# Patient Record
Sex: MALE | Race: WHITE | NOT HISPANIC OR LATINO | Employment: FULL TIME | URBAN - METROPOLITAN AREA
[De-identification: names, ages, dates, MRNs, and addresses within clinical notes are randomized per-mention and may not be internally consistent; named-entity substitution may affect disease eponyms.]

---

## 2021-01-29 ENCOUNTER — HOSPITAL ENCOUNTER (EMERGENCY)
Facility: CLINIC | Age: 36
Discharge: HOME | End: 2021-01-29
Attending: PHYSICIAN ASSISTANT | Admitting: PHYSICIAN ASSISTANT
Payer: COMMERCIAL

## 2021-01-29 ENCOUNTER — OFFICE VISIT (OUTPATIENT)
Dept: URGENT CARE | Facility: URGENT CARE | Age: 36
End: 2021-01-29
Payer: COMMERCIAL

## 2021-01-29 ENCOUNTER — APPOINTMENT (OUTPATIENT)
Dept: CT IMAGING | Facility: CLINIC | Age: 36
End: 2021-01-29
Attending: PHYSICIAN ASSISTANT
Payer: COMMERCIAL

## 2021-01-29 ENCOUNTER — NURSE TRIAGE (OUTPATIENT)
Dept: NURSING | Facility: CLINIC | Age: 36
End: 2021-01-29

## 2021-01-29 VITALS
OXYGEN SATURATION: 99 % | DIASTOLIC BLOOD PRESSURE: 86 MMHG | TEMPERATURE: 98.4 F | HEART RATE: 77 BPM | SYSTOLIC BLOOD PRESSURE: 134 MMHG

## 2021-01-29 VITALS
WEIGHT: 188 LBS | DIASTOLIC BLOOD PRESSURE: 86 MMHG | TEMPERATURE: 98.7 F | SYSTOLIC BLOOD PRESSURE: 141 MMHG | RESPIRATION RATE: 12 BRPM | BODY MASS INDEX: 29.51 KG/M2 | HEART RATE: 73 BPM | OXYGEN SATURATION: 98 % | HEIGHT: 67 IN

## 2021-01-29 DIAGNOSIS — K63.89 EPIPLOIC APPENDAGITIS: ICD-10-CM

## 2021-01-29 DIAGNOSIS — R10.32 LLQ ABDOMINAL PAIN: Primary | ICD-10-CM

## 2021-01-29 LAB
ALBUMIN SERPL-MCNC: 3.8 G/DL (ref 3.4–5)
ALBUMIN UR-MCNC: NEGATIVE MG/DL
ALP SERPL-CCNC: 60 U/L (ref 40–150)
ALT SERPL W P-5'-P-CCNC: 34 U/L (ref 0–70)
ANION GAP SERPL CALCULATED.3IONS-SCNC: 4 MMOL/L (ref 3–14)
APPEARANCE UR: CLEAR
AST SERPL W P-5'-P-CCNC: 24 U/L (ref 0–45)
BASOPHILS # BLD AUTO: 0 10E9/L (ref 0–0.2)
BASOPHILS NFR BLD AUTO: 0.5 %
BILIRUB SERPL-MCNC: 0.6 MG/DL (ref 0.2–1.3)
BILIRUB UR QL STRIP: NEGATIVE
BUN SERPL-MCNC: 13 MG/DL (ref 7–30)
CALCIUM SERPL-MCNC: 9.3 MG/DL (ref 8.5–10.1)
CHLORIDE SERPL-SCNC: 107 MMOL/L (ref 94–109)
CO2 SERPL-SCNC: 28 MMOL/L (ref 20–32)
COLOR UR AUTO: ABNORMAL
CREAT SERPL-MCNC: 0.95 MG/DL (ref 0.66–1.25)
DIFFERENTIAL METHOD BLD: NORMAL
EOSINOPHIL # BLD AUTO: 0.1 10E9/L (ref 0–0.7)
EOSINOPHIL NFR BLD AUTO: 2.3 %
ERYTHROCYTE [DISTWIDTH] IN BLOOD BY AUTOMATED COUNT: 11.5 % (ref 10–15)
GFR SERPL CREATININE-BSD FRML MDRD: >90 ML/MIN/{1.73_M2}
GLUCOSE SERPL-MCNC: 83 MG/DL (ref 70–99)
GLUCOSE UR STRIP-MCNC: NEGATIVE MG/DL
HCT VFR BLD AUTO: 45.2 % (ref 40–53)
HGB BLD-MCNC: 15.4 G/DL (ref 13.3–17.7)
HGB UR QL STRIP: NEGATIVE
IMM GRANULOCYTES # BLD: 0 10E9/L (ref 0–0.4)
IMM GRANULOCYTES NFR BLD: 0.4 %
KETONES UR STRIP-MCNC: 10 MG/DL
LACTATE BLD-SCNC: 0.7 MMOL/L (ref 0.7–2)
LEUKOCYTE ESTERASE UR QL STRIP: NEGATIVE
LIPASE SERPL-CCNC: 78 U/L (ref 73–393)
LYMPHOCYTES # BLD AUTO: 1.4 10E9/L (ref 0.8–5.3)
LYMPHOCYTES NFR BLD AUTO: 24.8 %
MCH RBC QN AUTO: 30.1 PG (ref 26.5–33)
MCHC RBC AUTO-ENTMCNC: 34.1 G/DL (ref 31.5–36.5)
MCV RBC AUTO: 88 FL (ref 78–100)
MONOCYTES # BLD AUTO: 0.7 10E9/L (ref 0–1.3)
MONOCYTES NFR BLD AUTO: 11.8 %
NEUTROPHILS # BLD AUTO: 3.4 10E9/L (ref 1.6–8.3)
NEUTROPHILS NFR BLD AUTO: 60.2 %
NITRATE UR QL: NEGATIVE
NRBC # BLD AUTO: 0 10*3/UL
NRBC BLD AUTO-RTO: 0 /100
PH UR STRIP: 6 PH (ref 5–7)
PLATELET # BLD AUTO: 226 10E9/L (ref 150–450)
POTASSIUM SERPL-SCNC: 3.8 MMOL/L (ref 3.4–5.3)
PROT SERPL-MCNC: 7.1 G/DL (ref 6.8–8.8)
RBC # BLD AUTO: 5.12 10E12/L (ref 4.4–5.9)
RBC #/AREA URNS AUTO: <1 /HPF (ref 0–2)
SODIUM SERPL-SCNC: 139 MMOL/L (ref 133–144)
SOURCE: ABNORMAL
SP GR UR STRIP: 1.01 (ref 1–1.03)
UROBILINOGEN UR STRIP-MCNC: NORMAL MG/DL (ref 0–2)
WBC # BLD AUTO: 5.6 10E9/L (ref 4–11)
WBC #/AREA URNS AUTO: <1 /HPF (ref 0–5)

## 2021-01-29 PROCEDURE — 81001 URINALYSIS AUTO W/SCOPE: CPT | Performed by: PHYSICIAN ASSISTANT

## 2021-01-29 PROCEDURE — 85025 COMPLETE CBC W/AUTO DIFF WBC: CPT | Performed by: PHYSICIAN ASSISTANT

## 2021-01-29 PROCEDURE — 250N000011 HC RX IP 250 OP 636: Performed by: PHYSICIAN ASSISTANT

## 2021-01-29 PROCEDURE — 99285 EMERGENCY DEPT VISIT HI MDM: CPT | Mod: 25

## 2021-01-29 PROCEDURE — 83605 ASSAY OF LACTIC ACID: CPT | Performed by: PHYSICIAN ASSISTANT

## 2021-01-29 PROCEDURE — 258N000003 HC RX IP 258 OP 636: Performed by: PHYSICIAN ASSISTANT

## 2021-01-29 PROCEDURE — 250N000009 HC RX 250: Performed by: PHYSICIAN ASSISTANT

## 2021-01-29 PROCEDURE — 74177 CT ABD & PELVIS W/CONTRAST: CPT

## 2021-01-29 PROCEDURE — 83690 ASSAY OF LIPASE: CPT | Performed by: PHYSICIAN ASSISTANT

## 2021-01-29 PROCEDURE — 80053 COMPREHEN METABOLIC PANEL: CPT | Performed by: PHYSICIAN ASSISTANT

## 2021-01-29 PROCEDURE — 99207 PR NO CHARGE LOS: CPT | Performed by: FAMILY MEDICINE

## 2021-01-29 PROCEDURE — 96360 HYDRATION IV INFUSION INIT: CPT | Mod: 59

## 2021-01-29 RX ORDER — IOPAMIDOL 755 MG/ML
500 INJECTION, SOLUTION INTRAVASCULAR ONCE
Status: COMPLETED | OUTPATIENT
Start: 2021-01-29 | End: 2021-01-29

## 2021-01-29 RX ORDER — SODIUM CHLORIDE 9 MG/ML
INJECTION, SOLUTION INTRAVENOUS CONTINUOUS
Status: DISCONTINUED | OUTPATIENT
Start: 2021-01-29 | End: 2021-01-29 | Stop reason: HOSPADM

## 2021-01-29 RX ORDER — OXYCODONE HYDROCHLORIDE 5 MG/1
5 TABLET ORAL EVERY 6 HOURS PRN
Qty: 12 TABLET | Refills: 0 | Status: SHIPPED | OUTPATIENT
Start: 2021-01-29 | End: 2022-12-15

## 2021-01-29 RX ADMIN — IOPAMIDOL 94 ML: 755 INJECTION, SOLUTION INTRAVENOUS at 15:41

## 2021-01-29 RX ADMIN — SODIUM CHLORIDE 1000 ML: 9 INJECTION, SOLUTION INTRAVENOUS at 15:21

## 2021-01-29 RX ADMIN — SODIUM CHLORIDE 64 ML: 9 INJECTION, SOLUTION INTRAVENOUS at 15:41

## 2021-01-29 ASSESSMENT — ENCOUNTER SYMPTOMS
ABDOMINAL PAIN: 1
FEVER: 0
COUGH: 0
CHILLS: 0
SHORTNESS OF BREATH: 0
VOMITING: 0
NAUSEA: 0
SORE THROAT: 0
BLOOD IN STOOL: 0
RHINORRHEA: 0

## 2021-01-29 ASSESSMENT — MIFFLIN-ST. JEOR: SCORE: 1736.39

## 2021-01-29 NOTE — ED TRIAGE NOTES
Pt here with c/o LLQ pain since Tuesday. Pain worse with movement. Non-radiating pain. No n/v/d or diaphoresis. ABC intact. A&O x4.

## 2021-01-29 NOTE — TELEPHONE ENCOUNTER
Patient is looking to get back in shape. Runs 1-2 marathns a year. He was walking on Monday 10 mile and Tuesday he walked 14 miles.  Pain on Tues he got an abdomen pain in lower left abdomen that came after walking 3/10 and constant. NO bulge in skin anywhere. NO vomiting. NO bowel or bladder changes. NO known fever. Bending or twisting makes the pain worse.   Normally when he runs the pain will come and go away within 30 minutes.     Advised [facility] for evaluation, asked if pain becomes severe to go to the ED> Hours and location of the [facility] given. Declined information on the [facility] location.     Uma Nuñez RN on 1/29/2021 at 8:20 AM        Additional Information    Negative: Passed out (i.e., fainted, collapsed and was not responding)    Negative: Shock suspected (e.g., cold/pale/clammy skin, too weak to stand, low BP, rapid pulse)    Negative: Sounds like a life-threatening emergency to the triager    Negative: SEVERE abdominal pain (e.g., excruciating)    Negative: Vomiting red blood or black (coffee ground) material    Negative: Bloody, black, or tarry bowel movements    Negative: Unable to urinate (or only a few drops) and bladder feels very full    Negative: Pain in scrotum persists > 1 hour    Negative: Chest pain    Negative: Pain is mainly in upper abdomen (if needed ask: 'is it mainly above the belly button?')    Constant abdominal pain lasting > 2 hours    Protocols used: ABDOMINAL PAIN - MALE-A-OH

## 2021-01-29 NOTE — PROGRESS NOTES
THIS IS A TRIAGE ENCOUNTER.     Vladimir Gallego is a 35 year old male who presents with a four-day  history of worsening severe LLQ abdominal pain triggered by palpation, by walking, by any bending at the waist, twisting of the torso in both directions, and prolonged running.  The pain is sharp and lasts about 30 minutes at a time.      Given the possible need for more sophisticated imaging than our X-rays, patient will go to the emergency room today for further evaluation.  I told the patient that he would not be charged for this brief triage evaluation.  .      Romeo Albert MD

## 2021-01-29 NOTE — ED PROVIDER NOTES
History   Chief Complaint:  Abdominal Pain     MARÍA Gallego is a 35 year old male, otherwise healthy who presents with left-sided abdominal pain for the past 3 days. The patient reports that he has had lower left abdominal pain for the past 3 days. This pain is non-radiating and worsens when he moves. At rest his pain is a 3-4/10, but it increases significantly with movement or palpation. He was seen at urgent care today and was referred to the emergency department for more sophisticated imaging studies. The patient does mentions having some diarrhea, but states that this has been going for a while and is unchanged. He otherwise denies any fever, chills, chest pain, shortness of breath, nausea, vomiting, bloody stool, cough, rhinorrhea, sore throat, urinary symptoms, testicular pain or rash. Of note, the patient states he normally runs marathons, but since the pandemic began he has not been very active. He does note running 14 minutes a few days ago and had a stitch in his side that never really went away and is in the same area as his current pain.     Review of Systems   Constitutional: Negative for chills and fever.   HENT: Negative for rhinorrhea and sore throat.    Respiratory: Negative for cough and shortness of breath.    Gastrointestinal: Positive for abdominal pain. Negative for blood in stool, nausea and vomiting.   Genitourinary: Negative.    Skin: Negative for rash.   All other systems reviewed and are negative.      Allergies:  The patient has no known allergies.     Medications:  The patient is not currently taking any prescribed medications.    Past Medical History:    The patient denies past medical history.    Past Surgical History:    Appendectomy     Social History:  The patient presents with a female compantion to the emergency department.  The patient states that he runs marathons.     Physical Exam     Patient Vitals for the past 24 hrs:   BP Temp Temp src Pulse Resp SpO2 Height  "Weight   01/29/21 1330 (!) 155/112 98.7  F (37.1  C) Oral 87 16 98 % 1.702 m (5' 7\") 85.3 kg (188 lb)       Physical Exam  Constitutional: Pleasant. Cooperative.   Eyes: Pupils equally round and reactive  HENT: Head is normal in appearance. Oropharynx is normal with moist mucus membranes.  Cardiovascular: Regular rate and rhythm and without murmurs.  Respiratory: Normal respiratory effort, lungs are clear bilaterally.  GI: LLQ TTP, otherwise non-tender, soft, non-distended. No guarding, rebound, or rigidity.  Musculoskeletal: No asymmetry of the lower extremities, no tenderness to palpation. No CVA TTP.  Skin: Normal, without rash.  Neurologic: Cranial nerves grossly intact, normal cognition, no focal deficits. Alert and oriented x 3.   Psychiatric: Normal affect.  Nursing notes and vital signs reviewed.      Emergency Department Course     Imaging:  CT Abdomen Pelvis W Contrast  1.  Epiploic appendagitis distal descending colon. No evidence of  bowel obstruction, diverticulitis or appendicitis.  2.  Abdominal and pelvic organs are within normal limits.  Reading per radiology.    Laboratory:  CBC: WNL. (WBC 5.6, HGB 15.4, )   CMP: AWNL (Creatinine 0.95)    Lactic Acid (Collected at 1519): 0.7  Lipase: 78    UA with micro: Ketones 10, o/w negative    Emergency Department Course:    Reviewed:  I reviewed nursing notes, vitals and care everywhere    Assessments:  1510 I obtained history and examined the patient as noted above.   1632 I rechecked the patient and explained findings.     Interventions:  1521 NS, 1 L, IV bolus      Disposition:  The patient was discharged to home.       Impression & Plan     Medical Decision Making:  Vladimir Gallego is a 35 year old male who presents to the ED for evaluation of abdominal pain.  Is been present for the last 3 days in the LLQ.  See HPI as above for additional details.  Vitals and physical exam as above.  Differential was broad and included diverticulitis, kidney " stone,  pathology, hernia, UTI/pyelonephritis, constipation, among others.  Work-up as above is notable for evidence consistent with epiploic appendagitis.  Suspect this is etiology of patient's symptoms.  Discussed treatment is conservative management.  Will provide short course of oxycodone for home.  Discussed narcotic precautions. Felt patient was safe for discharge to home. Discussed reasons to return. All questions answered. Patient discharged to home in stable condition.    Diagnosis:    ICD-10-CM    1. Epiploic appendagitis  K63.89 UA with Microscopic reflex to Culture       Discharge Medications:  New Prescriptions    OXYCODONE (ROXICODONE) 5 MG TABLET    Take 1 tablet (5 mg) by mouth every 6 hours as needed for pain       Scribe Disclosure:  I, Marc Courtney, am serving as a scribe on 1/29/2021 at 3:14 PM to personally document services performed by Valentino Joaquin PA-C based on my observations and the provider's statements to me.     This record was created at least in part using electronic voice recognition software, so please excuse any typographical errors.           Valentino Joaquin PA-C  01/29/21 7680

## 2021-06-01 ENCOUNTER — RECORDS - HEALTHEAST (OUTPATIENT)
Dept: ADMINISTRATIVE | Facility: CLINIC | Age: 36
End: 2021-06-01

## 2022-12-01 ENCOUNTER — HOSPITAL ENCOUNTER (EMERGENCY)
Facility: CLINIC | Age: 37
Discharge: HOME | End: 2022-12-01
Attending: EMERGENCY MEDICINE | Admitting: EMERGENCY MEDICINE
Payer: COMMERCIAL

## 2022-12-01 VITALS
OXYGEN SATURATION: 98 % | TEMPERATURE: 98.4 F | RESPIRATION RATE: 10 BRPM | HEART RATE: 84 BPM | DIASTOLIC BLOOD PRESSURE: 106 MMHG | SYSTOLIC BLOOD PRESSURE: 140 MMHG

## 2022-12-01 DIAGNOSIS — I48.91 ATRIAL FIBRILLATION, UNSPECIFIED TYPE (H): ICD-10-CM

## 2022-12-01 LAB
ANION GAP SERPL CALCULATED.3IONS-SCNC: 12 MMOL/L (ref 7–15)
ATRIAL RATE - MUSE: 147 BPM
BASOPHILS # BLD AUTO: 0.1 10E3/UL (ref 0–0.2)
BASOPHILS NFR BLD AUTO: 1 %
BUN SERPL-MCNC: 17.6 MG/DL (ref 6–20)
CALCIUM SERPL-MCNC: 9.4 MG/DL (ref 8.6–10)
CHLORIDE SERPL-SCNC: 103 MMOL/L (ref 98–107)
CREAT SERPL-MCNC: 0.93 MG/DL (ref 0.67–1.17)
DEPRECATED HCO3 PLAS-SCNC: 22 MMOL/L (ref 22–29)
DIASTOLIC BLOOD PRESSURE - MUSE: NORMAL MMHG
EOSINOPHIL # BLD AUTO: 0.4 10E3/UL (ref 0–0.7)
EOSINOPHIL NFR BLD AUTO: 4 %
ERYTHROCYTE [DISTWIDTH] IN BLOOD BY AUTOMATED COUNT: 11.7 % (ref 10–15)
GFR SERPL CREATININE-BSD FRML MDRD: >90 ML/MIN/1.73M2
GLUCOSE SERPL-MCNC: 120 MG/DL (ref 70–99)
HCT VFR BLD AUTO: 46.4 % (ref 40–53)
HGB BLD-MCNC: 15.9 G/DL (ref 13.3–17.7)
HOLD SPECIMEN: NORMAL
IMM GRANULOCYTES # BLD: 0.1 10E3/UL
IMM GRANULOCYTES NFR BLD: 1 %
INTERPRETATION ECG - MUSE: NORMAL
LYMPHOCYTES # BLD AUTO: 1.9 10E3/UL (ref 0.8–5.3)
LYMPHOCYTES NFR BLD AUTO: 22 %
MCH RBC QN AUTO: 31.1 PG (ref 26.5–33)
MCHC RBC AUTO-ENTMCNC: 34.3 G/DL (ref 31.5–36.5)
MCV RBC AUTO: 91 FL (ref 78–100)
MONOCYTES # BLD AUTO: 0.7 10E3/UL (ref 0–1.3)
MONOCYTES NFR BLD AUTO: 8 %
NEUTROPHILS # BLD AUTO: 5.6 10E3/UL (ref 1.6–8.3)
NEUTROPHILS NFR BLD AUTO: 64 %
NRBC # BLD AUTO: 0 10E3/UL
NRBC BLD AUTO-RTO: 0 /100
P AXIS - MUSE: NORMAL DEGREES
PLATELET # BLD AUTO: 271 10E3/UL (ref 150–450)
POTASSIUM SERPL-SCNC: 4.2 MMOL/L (ref 3.4–5.3)
PR INTERVAL - MUSE: NORMAL MS
QRS DURATION - MUSE: 86 MS
QT - MUSE: 274 MS
QTC - MUSE: 415 MS
R AXIS - MUSE: 52 DEGREES
RBC # BLD AUTO: 5.11 10E6/UL (ref 4.4–5.9)
SODIUM SERPL-SCNC: 137 MMOL/L (ref 136–145)
SYSTOLIC BLOOD PRESSURE - MUSE: NORMAL MMHG
T AXIS - MUSE: 15 DEGREES
TROPONIN T SERPL HS-MCNC: 9 NG/L
VENTRICULAR RATE- MUSE: 138 BPM
WBC # BLD AUTO: 8.7 10E3/UL (ref 4–11)

## 2022-12-01 PROCEDURE — 99285 EMERGENCY DEPT VISIT HI MDM: CPT | Mod: 25

## 2022-12-01 PROCEDURE — 999N000157 HC STATISTIC RCP TIME EA 10 MIN

## 2022-12-01 PROCEDURE — 92960 CARDIOVERSION ELECTRIC EXT: CPT

## 2022-12-01 PROCEDURE — 36415 COLL VENOUS BLD VENIPUNCTURE: CPT | Performed by: EMERGENCY MEDICINE

## 2022-12-01 PROCEDURE — 84484 ASSAY OF TROPONIN QUANT: CPT | Performed by: EMERGENCY MEDICINE

## 2022-12-01 PROCEDURE — 85025 COMPLETE CBC W/AUTO DIFF WBC: CPT | Performed by: EMERGENCY MEDICINE

## 2022-12-01 PROCEDURE — 999N000009 HC STATISTIC AIRWAY CARE

## 2022-12-01 PROCEDURE — 93005 ELECTROCARDIOGRAM TRACING: CPT

## 2022-12-01 PROCEDURE — 250N000011 HC RX IP 250 OP 636: Performed by: EMERGENCY MEDICINE

## 2022-12-01 PROCEDURE — 999N000099 HC STATISTIC MODERATE SEDATION < 10 MIN

## 2022-12-01 PROCEDURE — 99152 MOD SED SAME PHYS/QHP 5/>YRS: CPT

## 2022-12-01 PROCEDURE — 82310 ASSAY OF CALCIUM: CPT | Performed by: EMERGENCY MEDICINE

## 2022-12-01 RX ORDER — PROPOFOL 10 MG/ML
200 INJECTION, EMULSION INTRAVENOUS ONCE
Status: COMPLETED | OUTPATIENT
Start: 2022-12-01 | End: 2022-12-01

## 2022-12-01 RX ADMIN — PROPOFOL 30 MG: 10 INJECTION, EMULSION INTRAVENOUS at 07:50

## 2022-12-01 RX ADMIN — PROPOFOL 60 MG: 10 INJECTION, EMULSION INTRAVENOUS at 07:47

## 2022-12-01 RX ADMIN — PROPOFOL 30 MG: 10 INJECTION, EMULSION INTRAVENOUS at 07:49

## 2022-12-01 ASSESSMENT — ENCOUNTER SYMPTOMS
LIGHT-HEADEDNESS: 1
NAUSEA: 0
ABDOMINAL PAIN: 0
PALPITATIONS: 1
VOMITING: 0
DIARRHEA: 0

## 2022-12-01 ASSESSMENT — ACTIVITIES OF DAILY LIVING (ADL)
ADLS_ACUITY_SCORE: 33
ADLS_ACUITY_SCORE: 35

## 2022-12-01 NOTE — ED PROVIDER NOTES
History   Chief Complaint:  Palpitations       The history is provided by the patient.      Vladimir Gallego is a 37 year old male who presents for evaluation of palpitations. He reports feeling fine before going to sleep last night, but then at 0400 today he woke up to let the dog out and felt palpitations described as fast and occasionally skipped beats. The patient was able to go back to sleep, but then woke up with continued palpitations. He notes feeling lightheaded this morning, but denies any other recent symptoms including nausea, vomiting, chest pain, abdominal pain, and diarrhea. The patient notes that he had greater than two alcoholic drinks last night. He denies history of palpations or atrial fibrillation.     Review of Systems   Cardiovascular: Positive for palpitations. Negative for chest pain.   Gastrointestinal: Negative for abdominal pain, diarrhea, nausea and vomiting.   Neurological: Positive for light-headedness.   All other systems reviewed and are negative.    Allergies:  The patient has no known allergies.     Medications:  The patient is currently on no regular medications.     Past Medical History:     The patient denies past medical history including atrial fibrillation.     Social History:  The patient presents to the ED with his wife  Arrived from home via private vehicle   He reports alcohol use, including last night    Physical Exam     Patient Vitals for the past 24 hrs:   BP Temp Temp src Pulse Resp SpO2   12/01/22 0853 -- -- -- 84 10 98 %   12/01/22 0843 (!) 140/106 -- -- 84 (!) 0 98 %   12/01/22 0833 (!) 134/97 -- -- 86 12 99 %   12/01/22 0823 -- -- -- 73 18 99 %   12/01/22 0813 (!) 127/91 -- -- 80 (!) 5 100 %   12/01/22 0808 134/88 -- -- 81 10 98 %   12/01/22 0803 115/80 -- -- 76 25 98 %   12/01/22 0800 120/80 -- -- -- -- --   12/01/22 0759 -- -- -- 80 28 99 %   12/01/22 0754 -- -- -- -- 24 --   12/01/22 0754 -- -- -- 87 13 99 %   12/01/22 0752 -- -- -- 88 16 99 %   12/01/22  0751 -- -- -- (!) 168 10 100 %   12/01/22 0750 106/75 -- -- (!) 163 (!) 9 98 %   12/01/22 0749 (!) 114/100 -- -- (!) 170 10 99 %   12/01/22 0748 101/63 -- -- (!) 179 -- --   12/01/22 0747 -- -- -- (!) 158 (!) 7 100 %   12/01/22 0746 (!) 128/111 -- -- (!) 147 12 99 %   12/01/22 0623 (!) 132/101 98.4  F (36.9  C) Temporal 115 20 100 %       Physical Exam  Vitals: reviewed by me  General: Pt seen on [facility], cooperative, and alert to conversation  Eyes: Tracking well, clear conjunctiva BL  ENT: MMM, midline trachea.   Lungs: No tachypnea, no accessory muscle use. No respiratory distress.   CV: Rate as above, normal S1-S2, no additional heart sounds heard, irregularly irregular rhythm  Abd: Soft, non tender, no guarding, no rebound. Non distended  MSK: no joint effusion.  No evidence of trauma  Skin: No rash  Neuro: Clear speech and no facial droop.  Moving all extremity spontaneously, following all commands.  Psych: Not RIS, no e/o AH/VH      Emergency Department Course   ECG #1  ECG taken at 0614   Atrial fibrillation with rapid ventricular response   Abnormal ECG   No previous ECGs available   Rate 138 bpm. OR interval * ms. QRS duration 86 ms. QT/QTc 274/415 ms. P-R-T axes * 52 15.     ECG #2 (post cardioversion)  ECG taken at 0753  Normal sinus rhythm  Normal ECG    Rate 93 bpm. OR interval 134 ms. QRS duration 94 ms. QT/QTc 358/445 ms. P-R-T axes 35 46 31.       Laboratory:  Labs Ordered and Resulted from Time of ED Arrival to Time of ED Departure   BASIC METABOLIC PANEL - Abnormal       Result Value    Sodium 137      Potassium 4.2      Chloride 103      Carbon Dioxide (CO2) 22      Anion Gap 12      Urea Nitrogen 17.6      Creatinine 0.93      Calcium 9.4      Glucose 120 (*)     GFR Estimate >90     TROPONIN T, HIGH SENSITIVITY - Normal    Troponin T, High Sensitivity 9     CBC WITH PLATELETS AND DIFFERENTIAL    WBC Count 8.7      RBC Count 5.11      Hemoglobin 15.9      Hematocrit 46.4       MCV 91      MCH 31.1      MCHC 34.3      RDW 11.7      Platelet Count 271      % Neutrophils 64      % Lymphocytes 22      % Monocytes 8      % Eosinophils 4      % Basophils 1      % Immature Granulocytes 1      NRBCs per 100 WBC 0      Absolute Neutrophils 5.6      Absolute Lymphocytes 1.9      Absolute Monocytes 0.7      Absolute Eosinophils 0.4      Absolute Basophils 0.1      Absolute Immature Granulocytes 0.1      Absolute NRBCs 0.0          Procedures  Electrical Cardioversion         Procedure: Electrical Cardioversion        Indication: Atrial Fibrillation     Consent: Written from Patient     Universal Protocol: Universal protocol was followed and time out conducted just prior to starting procedure, confirming patient identity, site/side, procedure, patient position, and availability of correct equipment and implants.      Anesthesia/Sedation: Sedation: The patient was sedated, see separate procedure note for details.      Procedure Detail:   Electrodes were placed: Anterior/posterior  Trial #1: Synchronized Cardioversion at 200 Joules   Cardioversion was successful      Patient Status:  The patient tolerated the procedure well: Yes. There were no complications.      Sedation     Procedure: Procedural Sedation    Sedation Level: Moderate    Indication: Cardioversion    Consent: Written from Patient     Universal protocol: Universal protocol was followed and time out conducted just prior to starting procedure, confirming patient identity, site/side, procedure, patient position, and availability of correct equipment and implants.     Last PO Intake: Emergent procedure    ASA Class: Class 1 - A normal healthy patient     Exam:  Mallampati:  Grade 1 - Soft palate, uvula, and pillars visible    Lungs: Clear   Heart: Regular rate and rhythm checked, irreg irreg      Medication: Propofol  Dose: 120 mg (60 mg, then 2x 30 mg)    Monitoring:  Monitoring consisted of heart rate, cardiac, continuous pulse oximetry,  frequent blood pressure checks.   There was constant attendance by RN until patient recovered and constant attendance by physician until patient stable.   Intubation and emergency airway equipment available.     Response: Vital signs stable, oxygen saturations greater than 92%     Patient Status: Post procedure patient was alert.     Total Physician Drug Administration / Monitoring Time: 20 minutes.     Patient was monitored during recovery and returned to pre-procedure baseline.       Emergency Department Course:     Reviewed:  I reviewed nursing notes, vitals and past medical history     Assessments:  0651 I obtained history and examined the patient as noted above. I explained findings.  0730 I updated the patient and obtained consent for sedation and cardioversion procedure.  0741 I performed the sedation and cardioversion procedure at this time, see procedure note above.  0901 I rechecked and updated the patient. At this point I feel that the patient is safe for discharge, and the patient agrees.    Interventions:  0747 Propofol 60 mg IV  0749 Propofol 30 mg IV  0750 Propofol 30 mg IV    Disposition:  The patient was discharged to home.     Impression & Plan     Medical Decision Making:  This is a very pleasant 37-year-old male who presents to the emergency room with appears to be A. fib.  He is quite clear about the onset, and is a very reliable historian.  Therefore we were able to cardiovert him, see note as above, with good results.  After sedation, he is eating and drinking and walking around now and feels fine.  He has no chest pain before or after the event, and only palpitations.  It may have been related to alcohol, we recommended decreasing his intake, and following up with his regular doctor the next 1 week to see if he merits qualification for referral to a cardiologist.  Red flags for when to come back to the ER were discussed in detail, the patient and his wife are both quite reliable appearing and  I believe a come back to the ER if anything changes.    [id number] done, no anticoagulation needed, low risk       Diagnosis:    ICD-10-CM    1. Atrial fibrillation, unspecified type (H)  I48.91           Scribe Disclosure:  I, Miesha Baileyk, am serving as a scribe at 6:48 AM on 12/1/2022 to document services personally performed by Nile Goyal MD based on my observations and the provider's statements to me.        Nile Goyal MD  12/01/22 1130

## 2022-12-01 NOTE — PROGRESS NOTES
"An ETCO2 monitor was placed on the pt with 5LPM bled in. The Ambu bag, suction, and airways were setup and present in the room. Pt was able to maintain airway throughout the procedure with no intervention needed. ETCO2 levels were maintained between 22-38      Vital signs:  Temp: 98.4  F (36.9  C) Temp src: Temporal BP: 106/75 Pulse: 87   Resp: 24 SpO2: 99 %          Estimated body mass index is 29.44 kg/m  as calculated from the following:    Height as of 1/29/21: 1.702 m (5' 7\").    Weight as of 1/29/21: 85.3 kg (188 lb).        Jerry Turner, RT      "

## 2022-12-01 NOTE — ED TRIAGE NOTES
Here for palpitations since 4am, felt his heart rate is irregular associated with slight chest pain, lightheadedness, and feeling winded. ABCs intact      Triage Assessment     Row Name 12/01/22 0622       Triage Assessment (Adult)    Airway WDL WDL       Respiratory WDL    Respiratory WDL WDL       Cardiac WDL    Cardiac WDL WDL

## 2022-12-01 NOTE — DISCHARGE INSTRUCTIONS
As we discussed, your procedural sedation and cardioversion went very well and you had a normal heart rate since the procedure.  Come back to the ER immediately with any return of your palpitations, or any other concerns you have.  Please do follow with your regular doctor in the next 1 week to see if need to be referred to a cardiologist or an electrophysiologist, as we discussed.  Please do try and abstain from alcohol until you speak to your regular doctor, as this may have been a trigger.  Come back to the ER with any other concerns you have or any new symptoms.

## 2022-12-02 LAB
ATRIAL RATE - MUSE: 93 BPM
DIASTOLIC BLOOD PRESSURE - MUSE: NORMAL MMHG
INTERPRETATION ECG - MUSE: NORMAL
P AXIS - MUSE: 35 DEGREES
PR INTERVAL - MUSE: 134 MS
QRS DURATION - MUSE: 94 MS
QT - MUSE: 358 MS
QTC - MUSE: 445 MS
R AXIS - MUSE: 46 DEGREES
SYSTOLIC BLOOD PRESSURE - MUSE: NORMAL MMHG
T AXIS - MUSE: 31 DEGREES
VENTRICULAR RATE- MUSE: 93 BPM

## 2022-12-15 ENCOUNTER — OFFICE VISIT (OUTPATIENT)
Dept: FAMILY MEDICINE | Facility: CLINIC | Age: 37
End: 2022-12-15
Payer: COMMERCIAL

## 2022-12-15 VITALS
HEART RATE: 61 BPM | BODY MASS INDEX: 28.56 KG/M2 | HEIGHT: 67 IN | TEMPERATURE: 97.4 F | WEIGHT: 182 LBS | SYSTOLIC BLOOD PRESSURE: 110 MMHG | RESPIRATION RATE: 12 BRPM | OXYGEN SATURATION: 98 % | DIASTOLIC BLOOD PRESSURE: 62 MMHG

## 2022-12-15 DIAGNOSIS — I48.91 ATRIAL FIBRILLATION, UNSPECIFIED TYPE (H): Primary | ICD-10-CM

## 2022-12-15 PROCEDURE — 99203 OFFICE O/P NEW LOW 30 MIN: CPT | Performed by: NURSE PRACTITIONER

## 2022-12-15 ASSESSMENT — PAIN SCALES - GENERAL: PAINLEVEL: NO PAIN (0)

## 2022-12-15 NOTE — PROGRESS NOTES
"  Assessment & Plan     Atrial fibrillation, unspecified type (H)  Referral to cardiology.  Patient has not had repeat symptoms since his ER visit about 2 weeks ago.  Has stopped using alcohol in that time and encouraged him to continue to abstain until sees cardiology.  He is to be seen again emergently if symptoms recur.  Otherwise no further treatment unless having trouble getting in with cardiology quickly.  - Adult Cardiology Eval  Referral             MED REC REQUIRED  Post Medication Reconciliation Status:  Discharge medications reconciled, continue medications without change    BMI:   Estimated body mass index is 28.51 kg/m  as calculated from the following:    Height as of this encounter: 1.702 m (5' 7\").    Weight as of this encounter: 82.6 kg (182 lb).   Weight management plan: Discussed healthy diet and exercise guidelines    See instructions.    Return in about 1 week (around 12/22/2022) for symptoms failing to improve or worsening. ED if repeat episode.    Claudia Campoverde CNP  [facility] is a 37 year old, presenting for the following health issues:  Referral and ER F/U      HPI     ED/UC Followup:    Facility:  [facility]  Date of visit: 12/1/22  Reason for visit: palpitations   Current Status: pt would like referral to cardiology , pt states he has not had any Sx since ED visit   Has been doing well since discharge from the emergency department.  He was seen on 12/1/2022 and was cardioverted back to normal sinus rhythm.  He had had a couple of drinks the night of the episode, however states he will often have a couple of drinks at night after work.  He has been abstaining from alcohol since this incident.  He is looking for cardiology consultation today.  No repeated episodes or other odd symptoms since the ED visit have occurred.  States that a grandparent has history of A. fib the rest of his family history is as noted, mostly " hypertension.    Review of Systems   Constitutional, HEENT, cardiovascular, pulmonary, GI, , musculoskeletal, neuro, skin, endocrine and psych systems are negative, except as otherwise noted.      Objective    There were no vitals taken for this visit.  There is no height or weight on file to calculate BMI.  Physical Exam   GENERAL: healthy, alert and no distress  EYES: Eyes grossly normal to inspection, PERRL and conjunctivae and sclerae normal  HENT: ear canals and TM's normal, nose and mouth without ulcers or lesions  NECK: no adenopathy, no asymmetry, masses, or scars and thyroid normal to palpation  RESP: lungs clear to auscultation - no rales, rhonchi or wheezes  CV: regular rate and rhythm, normal S1 S2, no S3 or S4, no murmur, click or rub, no peripheral edema and peripheral pulses strong  ABDOMEN: soft, nontender, no hepatosplenomegaly, no masses and bowel sounds normal  MS: no gross musculoskeletal defects noted, no edema  SKIN: no suspicious lesions or rashes  NEURO: Normal strength and tone, mentation intact and speech normal  PSYCH: mentation appears normal, affect normal/bright              JAYLON Gan     [address] [address]  [email or web address]   Office: [phone]

## 2022-12-16 ENCOUNTER — LAB (OUTPATIENT)
Dept: LAB | Facility: CLINIC | Age: 37
End: 2022-12-16
Payer: COMMERCIAL

## 2022-12-16 ENCOUNTER — OFFICE VISIT (OUTPATIENT)
Dept: CARDIOLOGY | Facility: CLINIC | Age: 37
End: 2022-12-16
Payer: COMMERCIAL

## 2022-12-16 VITALS
DIASTOLIC BLOOD PRESSURE: 84 MMHG | OXYGEN SATURATION: 98 % | HEART RATE: 69 BPM | SYSTOLIC BLOOD PRESSURE: 126 MMHG | BODY MASS INDEX: 28.19 KG/M2 | HEIGHT: 67 IN | WEIGHT: 179.6 LBS

## 2022-12-16 DIAGNOSIS — I48.91 ATRIAL FIBRILLATION, UNSPECIFIED TYPE (H): ICD-10-CM

## 2022-12-16 LAB
ALT SERPL W P-5'-P-CCNC: 27 U/L (ref 10–50)
CHOLEST SERPL-MCNC: 195 MG/DL
HDLC SERPL-MCNC: 49 MG/DL
LDLC SERPL CALC-MCNC: 126 MG/DL
NONHDLC SERPL-MCNC: 146 MG/DL
TRIGL SERPL-MCNC: 102 MG/DL
TSH SERPL DL<=0.005 MIU/L-ACNC: 1.65 UIU/ML (ref 0.3–4.2)

## 2022-12-16 PROCEDURE — 84443 ASSAY THYROID STIM HORMONE: CPT | Performed by: INTERNAL MEDICINE

## 2022-12-16 PROCEDURE — 36415 COLL VENOUS BLD VENIPUNCTURE: CPT | Performed by: INTERNAL MEDICINE

## 2022-12-16 PROCEDURE — 80061 LIPID PANEL: CPT | Performed by: INTERNAL MEDICINE

## 2022-12-16 PROCEDURE — 93000 ELECTROCARDIOGRAM COMPLETE: CPT | Performed by: INTERNAL MEDICINE

## 2022-12-16 PROCEDURE — 99204 OFFICE O/P NEW MOD 45 MIN: CPT | Performed by: INTERNAL MEDICINE

## 2022-12-16 PROCEDURE — 84460 ALANINE AMINO (ALT) (SGPT): CPT | Performed by: INTERNAL MEDICINE

## 2022-12-16 NOTE — PROGRESS NOTES
"Cardiology Consultation      Vladimir Gallego MRN# [id number]   YOB: 1985 Age: 37 year old   Date of Visit 12/16/2022     Reason for consult:  Paroxysmal atrial fibrillation           Assessment and Plan:     1. Paroxysmal atrial fibrillation in the setting of increased alcohol and failure to use his dental appliance for sleep apnea    Recommend reducing the alcohol and caffeine in his diet.  Discussed the natural history of atrial fibrillation with recurrent paroxysmal atrial fibrillation in the future on provocation.    Will check TSH and lipids today.  We will check echocardiogram given his increased fatigue and the paroxysmal atrial fibrillation.    If recurrent symptoms, may consider antiarrhythmic therapy or ablation.      45 minutes spent today in review of past medical record, discussion with patient and postvisit charting    This note was transcribed using electronic voice recognition software, typographical errors may be present.                Chief Complaint:   Follow Up           History of Present Illness:   This patient is a very pleasant 37 year old male who has noticed more fatigue overall.  He has been tested for sleep apnea earlier this year and uses a dental appliance.  He was not using it on the night where he had paroxysmal atrial fibrillation.  He has been drinking to celebrate a friend's birthday earlier in the night and had a little more than usual.  He had been drinking 4+ daily drinks per day as well as caffeine and energy drinks.  He has cut out the alcohol since his atrial fibrillation on December 1.    He had cardioversion in the emergency department without sequela.  I reviewed the ECG from 12/1/2022 with atrial fibrillation heart rate 138 bpm.  ECG at rest without ischemia.             Physical Exam:     Vitals: /84 (BP Location: Right arm, Patient Position: Sitting, Cuff Size: Adult Regular)   Pulse 69   Ht 1.702 m (5' 7\")   Wt 81.5 kg (179 lb 9.6 oz)   SpO2 " 98%   BMI 28.13 kg/m    Constitutional:  cooperative, alert and oriented, well developed, well nourished, in no acute distress        Skin:  warm and dry to the touch, no apparent skin lesions or masses noted        Head:  normocephalic, no masses or lesions        Eyes:  pupils equal and round, conjunctivae and lids unremarkable, sclera white, no xanthalasma, EOMS intact, no nystagmus        ENT:  no pallor or cyanosis        Neck:  JVP normal        Chest:  normal symmetry        Cardiac: regular rhythm;normal S1 and S2                  Abdomen:  BS normoactive        Extremities and Back:  no deformities, clubbing, cyanosis, erythema observed        Neurological:  no gross motor deficits;affect appropriate                      Past Medical History:   I have reviewed this patient's past medical history  History reviewed. No pertinent past medical history.          Past Surgical History:   I have reviewed this patient's past surgical history  Past Surgical History:   Procedure Laterality Date     ABDOMEN SURGERY  2008?    Appendix     APPENDECTOMY  2008?               Social History:   I have reviewed this patient's social history  Social History     Tobacco Use     Smoking status: Never     Smokeless tobacco: Never   Substance Use Topics     Alcohol use: Not Currently             Family History:   I have reviewed this patient's family history  Family History   Problem Relation Age of Onset     Hypertension Mother      Hypertension Father              Allergies:   No Known Allergies          Medications:   I have reviewed this patient's current medications  No current outpatient medications on file.               Review of Systems:       Review of Systems:  Skin:        Eyes:       ENT:       Respiratory:  Negative    Cardiovascular:  Negative    Gastroenterology:      Genitourinary:       Musculoskeletal:       Neurologic:       Psychiatric:       Heme/Lymph/Imm:       Endocrine:                          Data:    All available laboratory data reviewed  No results found for: CHOL  No results found for: HDL  No results found for: LDL  No results found for: TRIG  No results found for: CHOLHDLRATIO  No results found for: TSH  Last Basic Metabolic Panel:  Lab Results   Component Value Date     12/01/2022     01/29/2021      Lab Results   Component Value Date    POTASSIUM 4.2 12/01/2022    POTASSIUM 3.8 01/29/2021     Lab Results   Component Value Date    CHLORIDE 103 12/01/2022    CHLORIDE 107 01/29/2021     Lab Results   Component Value Date    SHAI 9.4 12/01/2022    SHAI 9.3 01/29/2021     Lab Results   Component Value Date    CO2 22 12/01/2022    CO2 28 01/29/2021     Lab Results   Component Value Date    BUN 17.6 12/01/2022    BUN 13 01/29/2021     Lab Results   Component Value Date    CR 0.93 12/01/2022    CR 0.95 01/29/2021     Lab Results   Component Value Date     12/01/2022    GLC 83 01/29/2021     Lab Results   Component Value Date    WBC 8.7 12/01/2022    WBC 5.6 01/29/2021     Lab Results   Component Value Date    RBC 5.11 12/01/2022    RBC 5.12 01/29/2021     Lab Results   Component Value Date    HGB 15.9 12/01/2022    HGB 15.4 01/29/2021     Lab Results   Component Value Date    HCT 46.4 12/01/2022    HCT 45.2 01/29/2021     Lab Results   Component Value Date    MCV 91 12/01/2022    MCV 88 01/29/2021     Lab Results   Component Value Date    MCH 31.1 12/01/2022    MCH 30.1 01/29/2021     Lab Results   Component Value Date    MCHC 34.3 12/01/2022    MCHC 34.1 01/29/2021     Lab Results   Component Value Date    RDW 11.7 12/01/2022    RDW 11.5 01/29/2021     Lab Results   Component Value Date     12/01/2022     01/29/2021

## 2022-12-16 NOTE — LETTER
12/16/2022    Physician No Ref-Primary  No address on file    RE: Vladimir Gallego       Dear Colleague,     I had the pleasure of seeing Vladimir Gallego in the [facility].  Cardiology Consultation      Vladimir Gallego MRN# [id number]   YOB: 1985 Age: 37 year old   Date of Visit 12/16/2022     Reason for consult:  Paroxysmal atrial fibrillation           Assessment and Plan:     1. Paroxysmal atrial fibrillation in the setting of increased alcohol and failure to use his dental appliance for sleep apnea    Recommend reducing the alcohol and caffeine in his diet.  Discussed the natural history of atrial fibrillation with recurrent paroxysmal atrial fibrillation in the future on provocation.    Will check TSH and lipids today.  We will check echocardiogram given his increased fatigue and the paroxysmal atrial fibrillation.    If recurrent symptoms, may consider antiarrhythmic therapy or ablation.      45 minutes spent today in review of past medical record, discussion with patient and postvisit charting    This note was transcribed using electronic voice recognition software, typographical errors may be present.                Chief Complaint:   Follow Up           History of Present Illness:   This patient is a very pleasant 37 year old male who has noticed more fatigue overall.  He has been tested for sleep apnea earlier this year and uses a dental appliance.  He was not using it on the night where he had paroxysmal atrial fibrillation.  He has been drinking to celebrate a friend's birthday earlier in the night and had a little more than usual.  He had been drinking 4+ daily drinks per day as well as caffeine and energy drinks.  He has cut out the alcohol since his atrial fibrillation on December 1.    He had cardioversion in the emergency department without sequela.  I reviewed the ECG from 12/1/2022 with atrial fibrillation heart rate 138 bpm.  ECG at rest without  "ischemia.             Physical Exam:     Vitals: /84 (BP Location: Right arm, Patient Position: Sitting, Cuff Size: Adult Regular)   Pulse 69   Ht 1.702 m (5' 7\")   Wt 81.5 kg (179 lb 9.6 oz)   SpO2 98%   BMI 28.13 kg/m    Constitutional:  cooperative, alert and oriented, well developed, well nourished, in no acute distress        Skin:  warm and dry to the touch, no apparent skin lesions or masses noted        Head:  normocephalic, no masses or lesions        Eyes:  pupils equal and round, conjunctivae and lids unremarkable, sclera white, no xanthalasma, EOMS intact, no nystagmus        ENT:  no pallor or cyanosis        Neck:  JVP normal        Chest:  normal symmetry        Cardiac: regular rhythm;normal S1 and S2                  Abdomen:  BS normoactive        Extremities and Back:  no deformities, clubbing, cyanosis, erythema observed        Neurological:  no gross motor deficits;affect appropriate                      Past Medical History:   I have reviewed this patient's past medical history  History reviewed. No pertinent past medical history.          Past Surgical History:   I have reviewed this patient's past surgical history  Past Surgical History:   Procedure Laterality Date     ABDOMEN SURGERY  2008?    Appendix     APPENDECTOMY  2008?               Social History:   I have reviewed this patient's social history  Social History     Tobacco Use     Smoking status: Never     Smokeless tobacco: Never   Substance Use Topics     Alcohol use: Not Currently             Family History:   I have reviewed this patient's family history  Family History   Problem Relation Age of Onset     Hypertension Mother      Hypertension Father              Allergies:   No Known Allergies          Medications:   I have reviewed this patient's current medications  No current outpatient medications on file.               Review of Systems:       Review of Systems:  Skin:        Eyes:       ENT:       Respiratory:  " Negative    Cardiovascular:  Negative    Gastroenterology:      Genitourinary:       Musculoskeletal:       Neurologic:       Psychiatric:       Heme/Lymph/Imm:       Endocrine:                          Data:   All available laboratory data reviewed  No results found for: CHOL  No results found for: HDL  No results found for: LDL  No results found for: TRIG  No results found for: CHOLHDLRATIO  No results found for: TSH  Last Basic Metabolic Panel:  Lab Results   Component Value Date     12/01/2022     01/29/2021      Lab Results   Component Value Date    POTASSIUM 4.2 12/01/2022    POTASSIUM 3.8 01/29/2021     Lab Results   Component Value Date    CHLORIDE 103 12/01/2022    CHLORIDE 107 01/29/2021     Lab Results   Component Value Date    SHAI 9.4 12/01/2022    SHAI 9.3 01/29/2021     Lab Results   Component Value Date    CO2 22 12/01/2022    CO2 28 01/29/2021     Lab Results   Component Value Date    BUN 17.6 12/01/2022    BUN 13 01/29/2021     Lab Results   Component Value Date    CR 0.93 12/01/2022    CR 0.95 01/29/2021     Lab Results   Component Value Date     12/01/2022    GLC 83 01/29/2021     Lab Results   Component Value Date    WBC 8.7 12/01/2022    WBC 5.6 01/29/2021     Lab Results   Component Value Date    RBC 5.11 12/01/2022    RBC 5.12 01/29/2021     Lab Results   Component Value Date    HGB 15.9 12/01/2022    HGB 15.4 01/29/2021     Lab Results   Component Value Date    HCT 46.4 12/01/2022    HCT 45.2 01/29/2021     Lab Results   Component Value Date    MCV 91 12/01/2022    MCV 88 01/29/2021     Lab Results   Component Value Date    MCH 31.1 12/01/2022    MCH 30.1 01/29/2021     Lab Results   Component Value Date    MCHC 34.3 12/01/2022    MCHC 34.1 01/29/2021     Lab Results   Component Value Date    RDW 11.7 12/01/2022    RDW 11.5 01/29/2021     Lab Results   Component Value Date     12/01/2022     01/29/2021       Thank you for allowing me to participate in the care  of your patient.      Sincerely,     Kiko Rosa MD     [facility]    cc:   Claudia Campoverde, CNP  [address] [place] [id number]

## 2022-12-23 ENCOUNTER — HOSPITAL ENCOUNTER (OUTPATIENT)
Dept: CARDIOLOGY | Facility: CLINIC | Age: 37
Discharge: HOME | End: 2022-12-23
Attending: INTERNAL MEDICINE | Admitting: INTERNAL MEDICINE
Payer: COMMERCIAL

## 2022-12-23 DIAGNOSIS — I48.91 ATRIAL FIBRILLATION, UNSPECIFIED TYPE (H): ICD-10-CM

## 2022-12-23 LAB — LVEF ECHO: NORMAL

## 2022-12-23 PROCEDURE — 93306 TTE W/DOPPLER COMPLETE: CPT

## 2022-12-23 PROCEDURE — 93306 TTE W/DOPPLER COMPLETE: CPT | Mod: 26 | Performed by: INTERNAL MEDICINE

## 2023-01-30 ENCOUNTER — HEALTH MAINTENANCE LETTER (OUTPATIENT)
Age: 38
End: 2023-01-30

## 2023-05-22 ENCOUNTER — HOSPITAL ENCOUNTER (EMERGENCY)
Facility: CLINIC | Age: 38
Discharge: HOME | End: 2023-05-22
Attending: EMERGENCY MEDICINE | Admitting: EMERGENCY MEDICINE
Payer: COMMERCIAL

## 2023-05-22 ENCOUNTER — APPOINTMENT (OUTPATIENT)
Dept: CT IMAGING | Facility: CLINIC | Age: 38
End: 2023-05-22
Attending: EMERGENCY MEDICINE
Payer: COMMERCIAL

## 2023-05-22 VITALS
RESPIRATION RATE: 18 BRPM | OXYGEN SATURATION: 100 % | HEART RATE: 71 BPM | DIASTOLIC BLOOD PRESSURE: 100 MMHG | HEIGHT: 67 IN | TEMPERATURE: 97.6 F | SYSTOLIC BLOOD PRESSURE: 147 MMHG | WEIGHT: 170 LBS | BODY MASS INDEX: 26.68 KG/M2

## 2023-05-22 DIAGNOSIS — R10.84 ABDOMINAL PAIN, GENERALIZED: ICD-10-CM

## 2023-05-22 DIAGNOSIS — R74.8 ELEVATED CK: ICD-10-CM

## 2023-05-22 LAB
ALBUMIN SERPL BCG-MCNC: 4.5 G/DL (ref 3.5–5.2)
ALBUMIN UR-MCNC: NEGATIVE MG/DL
ALP SERPL-CCNC: 67 U/L (ref 40–129)
ALT SERPL W P-5'-P-CCNC: 41 U/L (ref 10–50)
ANION GAP SERPL CALCULATED.3IONS-SCNC: 9 MMOL/L (ref 7–15)
APPEARANCE UR: CLEAR
AST SERPL W P-5'-P-CCNC: 61 U/L (ref 10–50)
BASOPHILS # BLD AUTO: 0 10E3/UL (ref 0–0.2)
BASOPHILS NFR BLD AUTO: 0 %
BILIRUB SERPL-MCNC: 0.6 MG/DL
BILIRUB UR QL STRIP: NEGATIVE
BUN SERPL-MCNC: 19 MG/DL (ref 6–20)
CALCIUM SERPL-MCNC: 9.7 MG/DL (ref 8.6–10)
CHLORIDE SERPL-SCNC: 104 MMOL/L (ref 98–107)
CK SERPL-CCNC: 1058 U/L (ref 39–308)
CK SERPL-CCNC: 759 U/L (ref 39–308)
COLOR UR AUTO: ABNORMAL
CREAT SERPL-MCNC: 0.88 MG/DL (ref 0.67–1.17)
DEPRECATED HCO3 PLAS-SCNC: 26 MMOL/L (ref 22–29)
EOSINOPHIL # BLD AUTO: 0.1 10E3/UL (ref 0–0.7)
EOSINOPHIL NFR BLD AUTO: 1 %
ERYTHROCYTE [DISTWIDTH] IN BLOOD BY AUTOMATED COUNT: 11.5 % (ref 10–15)
GFR SERPL CREATININE-BSD FRML MDRD: >90 ML/MIN/1.73M2
GLUCOSE SERPL-MCNC: 119 MG/DL (ref 70–99)
GLUCOSE UR STRIP-MCNC: NEGATIVE MG/DL
HCT VFR BLD AUTO: 44.9 % (ref 40–53)
HGB BLD-MCNC: 15.4 G/DL (ref 13.3–17.7)
HGB UR QL STRIP: NEGATIVE
HOLD SPECIMEN: NORMAL
HOLD SPECIMEN: NORMAL
IMM GRANULOCYTES # BLD: 0 10E3/UL
IMM GRANULOCYTES NFR BLD: 0 %
KETONES UR STRIP-MCNC: NEGATIVE MG/DL
LEUKOCYTE ESTERASE UR QL STRIP: NEGATIVE
LYMPHOCYTES # BLD AUTO: 1.1 10E3/UL (ref 0.8–5.3)
LYMPHOCYTES NFR BLD AUTO: 13 %
MCH RBC QN AUTO: 30.1 PG (ref 26.5–33)
MCHC RBC AUTO-ENTMCNC: 34.3 G/DL (ref 31.5–36.5)
MCV RBC AUTO: 88 FL (ref 78–100)
MONOCYTES # BLD AUTO: 0.5 10E3/UL (ref 0–1.3)
MONOCYTES NFR BLD AUTO: 6 %
MUCOUS THREADS #/AREA URNS LPF: PRESENT /LPF
NEUTROPHILS # BLD AUTO: 6.8 10E3/UL (ref 1.6–8.3)
NEUTROPHILS NFR BLD AUTO: 80 %
NITRATE UR QL: NEGATIVE
NRBC # BLD AUTO: 0 10E3/UL
NRBC BLD AUTO-RTO: 0 /100
PH UR STRIP: 6.5 [PH] (ref 5–7)
PLATELET # BLD AUTO: 236 10E3/UL (ref 150–450)
POTASSIUM SERPL-SCNC: 3.9 MMOL/L (ref 3.4–5.3)
PROT SERPL-MCNC: 6.7 G/DL (ref 6.4–8.3)
RBC # BLD AUTO: 5.11 10E6/UL (ref 4.4–5.9)
RBC URINE: 1 /HPF
SODIUM SERPL-SCNC: 139 MMOL/L (ref 136–145)
SP GR UR STRIP: 1.02 (ref 1–1.03)
UROBILINOGEN UR STRIP-MCNC: NORMAL MG/DL
WBC # BLD AUTO: 8.6 10E3/UL (ref 4–11)
WBC URINE: 2 /HPF

## 2023-05-22 PROCEDURE — 250N000011 HC RX IP 250 OP 636: Performed by: EMERGENCY MEDICINE

## 2023-05-22 PROCEDURE — 74177 CT ABD & PELVIS W/CONTRAST: CPT

## 2023-05-22 PROCEDURE — 85025 COMPLETE CBC W/AUTO DIFF WBC: CPT | Performed by: EMERGENCY MEDICINE

## 2023-05-22 PROCEDURE — 99285 EMERGENCY DEPT VISIT HI MDM: CPT | Mod: 25

## 2023-05-22 PROCEDURE — 80053 COMPREHEN METABOLIC PANEL: CPT | Performed by: EMERGENCY MEDICINE

## 2023-05-22 PROCEDURE — 250N000013 HC RX MED GY IP 250 OP 250 PS 637: Performed by: EMERGENCY MEDICINE

## 2023-05-22 PROCEDURE — 258N000003 HC RX IP 258 OP 636: Performed by: EMERGENCY MEDICINE

## 2023-05-22 PROCEDURE — 36415 COLL VENOUS BLD VENIPUNCTURE: CPT | Performed by: EMERGENCY MEDICINE

## 2023-05-22 PROCEDURE — 81003 URINALYSIS AUTO W/O SCOPE: CPT | Performed by: EMERGENCY MEDICINE

## 2023-05-22 PROCEDURE — 96360 HYDRATION IV INFUSION INIT: CPT | Mod: 59

## 2023-05-22 PROCEDURE — 82550 ASSAY OF CK (CPK): CPT | Performed by: EMERGENCY MEDICINE

## 2023-05-22 RX ORDER — IOPAMIDOL 755 MG/ML
85 INJECTION, SOLUTION INTRAVASCULAR ONCE
Status: COMPLETED | OUTPATIENT
Start: 2023-05-22 | End: 2023-05-22

## 2023-05-22 RX ORDER — ACETAMINOPHEN 500 MG
1000 TABLET ORAL ONCE
Status: COMPLETED | OUTPATIENT
Start: 2023-05-22 | End: 2023-05-22

## 2023-05-22 RX ADMIN — IOPAMIDOL 85 ML: 755 INJECTION, SOLUTION INTRAVENOUS at 12:14

## 2023-05-22 RX ADMIN — ACETAMINOPHEN 1000 MG: 500 TABLET, FILM COATED ORAL at 12:01

## 2023-05-22 RX ADMIN — SODIUM CHLORIDE 1000 ML: 9 INJECTION, SOLUTION INTRAVENOUS at 11:44

## 2023-05-22 ASSESSMENT — ENCOUNTER SYMPTOMS
DIARRHEA: 0
BLOOD IN STOOL: 0
VOMITING: 0
ABDOMINAL PAIN: 1
NAUSEA: 0

## 2023-05-22 ASSESSMENT — ACTIVITIES OF DAILY LIVING (ADL)
ADLS_ACUITY_SCORE: 35
ADLS_ACUITY_SCORE: 35

## 2023-05-22 NOTE — DISCHARGE INSTRUCTIONS
Please monitor symptoms very closely.    Be sure to drink adequate fluids until your urine is clear in color.    Follow-up with primary care provider in 1 to 2 days for recheck    Return to the ER if you develop worsening abdominal pain, pain in your upper or lower extremities, or any other new or troubling symptoms.

## 2023-05-22 NOTE — ED NOTES
Patient arrives with c/o significant lower abdominal pain that started at 0900. He ran a marathon yesterday and took some Ibuprofen to help with aches. States abdominal pain is equal across the lower abdomen and states it's tender on palpation.

## 2023-05-22 NOTE — ED PROVIDER NOTES
History     Chief Complaint:  Abdominal Pain       The history is provided by the patient.      Vladimir Gallego is a 38 year old male who presents with generalized abdominal pain.  Patient reports that yesterday, he ran a full marathon in [place].  He finished around 11-11:30 AM.  During his raise, he reports maintaining adequate hydration, consuming approximately 2.5 L of fluids during the race, and multiple glasses of water thereafter.  Following the race, he noted some muscular discomfort involving his bilateral legs, as well as hip pain, which she attributed to his race.  Earlier this morning, the patient developed lower abdominal pain beginning around 9 AM, that has subsequently become more generalized in location.  He notes it more on the left than the right.  He has not urinated since the pain came on.  He denies ever experiencing symptoms similar to this in the past.  He took 2 doses of ibuprofen prior to arrival.  He is not on anticoagulation.  He denies nausea, vomiting, fevers, diarrhea, nor hematochezia.  He reports an isolated episode of atrial fibrillation several months previously, though not currently on anticoagulation.    Independent Historian: None     Review of External Notes: None      ROS:  Review of Systems   Gastrointestinal: Positive for abdominal pain. Negative for blood in stool, diarrhea, nausea and vomiting.   All other systems reviewed and are negative.    Allergies:  Bee Venom     Medications:    The patient denies current use of medications.     Past Medical History:    A-fib     Past Surgical History:    Appendectomy     Family History:    Mother - HTN   Mother - HTN     Social History:  Denies smoking, smokeless tobacco use, current alcohol use, or drug use   The patient presents to the ED alone via private vehicle     Physical Exam     Patient Vitals for the past 24 hrs:   BP Temp Temp src Pulse Resp SpO2 Height Weight   05/22/23 1024 (!) 147/100 97.6  F (36.4  C) Temporal  "71 18 100 % 1.702 m (5' 7\") 77.1 kg (170 lb)        Physical Exam  General:              Well-nourished              Speaking in full sentences  Eyes:              Conjunctiva without injection or scleral icterus  ENT:              Moist mucous membranes              Nares patent              Pinnae normal  Neck:              Full ROM              No stiffness appreciated  Resp:              Lungs CTAB              No crackles, wheezing or audible rubs              Good air movement  CV:                    Normal rate, regular rhythm              S1 and S2 present              No murmur, gallop or rub  GI:              BS present              Abdomen soft without distention              Mild tenderness to palpation L>R of abdomen   No palpable mass              No overlying skin changes              No guarding or rebound tenderness  Skin:              Warm, dry, well perfused              No rashes or open wounds on exposed skin  MSK:              Moves all extremities              No focal deformities or swelling              Compartments to upper and lower extremities are soft throughout   Extremities are warm, well-perfused  Neuro:              Alert              Answers questions appropriately              Moves all extremities equally              Gait stable  Psych:              Normal affect, normal mood     Emergency Department Course   Imaging:  CT Abdomen Pelvis w Contrast  Final Result  IMPRESSION:   1.  No acute abnormality identified.  2.  Prominent stool distends the rectum.  JANICE MONK MD     Report per radiology    Laboratory:  Labs Ordered and Resulted from Time of ED Arrival to Time of ED Departure   COMPREHENSIVE METABOLIC PANEL - Abnormal       Result Value    Sodium 139      Potassium 3.9      Chloride 104      Carbon Dioxide (CO2) 26      Anion Gap 9      Urea Nitrogen 19.0      Creatinine 0.88      Calcium 9.7      Glucose 119 (*)     Alkaline Phosphatase 67      AST 61 (*)     ALT 41      " Protein Total 6.7      Albumin 4.5      Bilirubin Total 0.6      GFR Estimate >90     CK TOTAL - Abnormal    CK 1,058 (*)    ROUTINE UA WITH MICROSCOPIC REFLEX TO CULTURE - Abnormal    Color Urine Light Yellow      Appearance Urine Clear      Glucose Urine Negative      Bilirubin Urine Negative      Ketones Urine Negative      Specific Gravity Urine 1.022      Blood Urine Negative      pH Urine 6.5      Protein Albumin Urine Negative      Urobilinogen Urine Normal      Nitrite Urine Negative      Leukocyte Esterase Urine Negative      Mucus Urine Present (*)     RBC Urine 1      WBC Urine 2     CK TOTAL - Abnormal     (*)    CBC WITH PLATELETS AND DIFFERENTIAL    WBC Count 8.6      RBC Count 5.11      Hemoglobin 15.4      Hematocrit 44.9      MCV 88      MCH 30.1      MCHC 34.3      RDW 11.5      Platelet Count 236      % Neutrophils 80      % Lymphocytes 13      % Monocytes 6      % Eosinophils 1      % Basophils 0      % Immature Granulocytes 0      NRBCs per 100 WBC 0      Absolute Neutrophils 6.8      Absolute Lymphocytes 1.1      Absolute Monocytes 0.5      Absolute Eosinophils 0.1      Absolute Basophils 0.0      Absolute Immature Granulocytes 0.0      Absolute NRBCs 0.0        Emergency Department Course & Assessments:    Interventions:  1144 Tylenol 1 g PO      Consultations/Discussion of Management or Tests:  N/A     Social Determinants of Health affecting care:  None     Assessments:  1151 I obtained history and examined the patient as noted above.  1422 I rechecked the patient and explained findings. I discussed plan for discharge home.    Disposition:  The patient was discharged to home.     Impression & Plan    Medical Decision Making:  Vladimir Gallego is a very pleasant 38-year-old male presenting to the ED for evaluation of abdominal pain.  VS on presentation reveal elevated BP, though otherwise are unremarkable.  Examination reveals a well-appearing male, resting comfortably on the gurney,  demonstrating mild abdominal tenderness, left greater than right, that is generalized in location.  Broad differential diagnosis considered including though not limited to, rhabdomyolysis, muscular strain, rectus sheath hematoma, retroperitoneal hemorrhage, abdominal compartment syndrome, ureteral stone, colitis, intra-abdominal hemorrhage, zoster, UTI, among others.  Precise etiology for patient's abdominal discomfort not entirely clear, though may be secondary to nontraumatic rhabdomyolysis in the context of recent marathon.  His initial CK returned elevated at 1059.  Given his discomfort, advanced imaging was pursued, fortunately revealing no evidence of acute intra-abdominal pathology, nor evidence of significant hematoma to the rectus abdominis muscles.  Remainder of extremity exam reveals no localizing tenderness and compartments are soft throughout, arguing against acute compartment syndrome as the cause of his elevated CK.  Abdominal compartment syndrome also considered though felt unlikely given above history, absence of penetrating abdominal injury, soft/nontender abdomen, absence of significant volume resuscitation preceding development of symptoms, and clinical well appearance.  Patient was provided IV hydration, and repeat CK improved to 759.  His laboratory studies otherwise reveal unremarkable metabolic function, normal creatinine, unremarkable CBC, and UA that does not suggest infection.  He has no overlying skin changes to suggest zoster.  Repeat abdominal exam is soft and patient feeling markedly improved on recheck.  We discussed the above results, and disposition options including hospital observation versus discharge home and very close outpatient follow-up.  He is electing for the latter which I feel to be safe and reasonable.  Discussed the importance of encouraging adequate fluid consumption until his urine is clear.  I have also recommended close monitoring of symptoms.  He is to follow-up  with PCP in 1 to 2 days for recheck.  Return to ED with worsening abdominal pain, pain to his upper or lower extremities, or any other new or troubling symptoms.  Patient comfortable with outlined plan of care and questions have been answered prior to discharge.    Diagnosis:    ICD-10-CM    1. Abdominal pain, generalized  R10.84       2. Elevated CK  R74.8            Scribe Disclosure:  I, Emmanueltara Bazzi, am serving as a scribe at 12:08 PM on 5/22/2023 to document services personally performed by Juan Manuel Hanson MD based on my observations and the provider's statements to me.    5/22/2023   Juan Manuel Hanson MD Roach, Brian Donald, MD  05/22/23 [id number]

## 2023-05-24 ENCOUNTER — OFFICE VISIT (OUTPATIENT)
Dept: INTERNAL MEDICINE | Facility: CLINIC | Age: 38
End: 2023-05-24
Payer: COMMERCIAL

## 2023-05-24 VITALS
HEART RATE: 66 BPM | WEIGHT: 171.4 LBS | DIASTOLIC BLOOD PRESSURE: 94 MMHG | RESPIRATION RATE: 16 BRPM | OXYGEN SATURATION: 97 % | SYSTOLIC BLOOD PRESSURE: 142 MMHG | HEIGHT: 67 IN | BODY MASS INDEX: 26.9 KG/M2 | TEMPERATURE: 97.3 F

## 2023-05-24 DIAGNOSIS — M79.605 PAIN IN BOTH LOWER EXTREMITIES: ICD-10-CM

## 2023-05-24 DIAGNOSIS — R10.32 LLQ ABDOMINAL PAIN: Primary | ICD-10-CM

## 2023-05-24 DIAGNOSIS — R03.0 ELEVATED BLOOD PRESSURE READING WITHOUT DIAGNOSIS OF HYPERTENSION: ICD-10-CM

## 2023-05-24 DIAGNOSIS — M79.604 PAIN IN BOTH LOWER EXTREMITIES: ICD-10-CM

## 2023-05-24 PROCEDURE — 99213 OFFICE O/P EST LOW 20 MIN: CPT | Performed by: FAMILY MEDICINE

## 2023-05-24 NOTE — PROGRESS NOTES
"    (R10.32) LLQ abdominal pain  (primary encounter diagnosis)  Comment:   Resolved at this time.  Plan:   Observe.      (M79.604,  M79.605) Pain in both lower extremities  Comment:   Improved.  Seems to have had some quad pains even during training.  Plan:   He will be assessing his future plans regarding running.      (R03.0) Elevated blood pressure reading without diagnosis of hypertension  Comment:   BP elevated in ER and mildly elevated today.  Plan:   Best to get some more readings.  I gave him a guideline.  Recommend treating if consistently above.        Lisandro Gilbert is a 38 year old, presenting for the following health issues:  ER F/U        5/24/2023     3:49 PM   Additional Questions   Roomed by Karen Thomson cma     [facility]     ED/UC Followup:    Facility:  [facility]  Date of visit: 056/22/23  Reason for visit: abdominal pain  Current Status: follow up, no pain      ER record reviewed.    He had run a marathon 1 day previous.  The next morning developed LLQ and lower extremity pain.  CK was elevated but improving.  He was hydrated.  He had a negative abdominal CT.    These symptoms have basically resolved at this time.  He has no abdominal pain or nausea.  No diarrhea.  Also his legs feel relatively normal.  No hip pain or thigh pain to speak of.    His blood pressure was elevated in the ER.  He has had some borderline readings before.  He has a positive family history for hypertension.    He had 1 episode of atrial fibrillation and was cardioverted.  He had a cardiology consult.      Review of Systems     Currently no fever or chills.  No shortness of breath or cough.  No chest pain, palpitations, syncope.  No abdominal pain.  No urinary difficulty.  No rashes.  She has such a hard time sticking with the ER        Objective    BP (!) 142/94 (BP Location: Right arm, Patient Position: Chair, Cuff Size: Adult Large)   Pulse 66   Temp 97.3  F (36.3  C) (Tympanic)   Resp 16   Ht 1.702 m (5' 7.01\")   Wt " 77.7 kg (171 lb 6.4 oz)   SpO2 97%   BMI 26.84 kg/m    There is no height or weight on file to calculate BMI.  Physical Exam     He appears well in ge  No thyromegaly.  Nonlabored breathing.  Abdomen nondistended, no localized tenderness or guarding.  No fullness.  Hip range of motion is normal without significant pain.  No palpable tenderness in lower extremities.

## 2023-06-03 ENCOUNTER — HOSPITAL ENCOUNTER (EMERGENCY)
Facility: CLINIC | Age: 38
Discharge: HOME | End: 2023-06-03
Attending: EMERGENCY MEDICINE | Admitting: EMERGENCY MEDICINE
Payer: COMMERCIAL

## 2023-06-03 VITALS
BODY MASS INDEX: 26.62 KG/M2 | WEIGHT: 170 LBS | RESPIRATION RATE: 28 BRPM | HEART RATE: 80 BPM | OXYGEN SATURATION: 97 % | TEMPERATURE: 96.8 F | SYSTOLIC BLOOD PRESSURE: 122 MMHG | DIASTOLIC BLOOD PRESSURE: 78 MMHG

## 2023-06-03 DIAGNOSIS — I48.91 ATRIAL FIBRILLATION WITH RVR (H): ICD-10-CM

## 2023-06-03 LAB
ALBUMIN SERPL BCG-MCNC: 4.4 G/DL (ref 3.5–5.2)
ALP SERPL-CCNC: 75 U/L (ref 40–129)
ALT SERPL W P-5'-P-CCNC: 33 U/L (ref 10–50)
ANION GAP SERPL CALCULATED.3IONS-SCNC: 13 MMOL/L (ref 7–15)
AST SERPL W P-5'-P-CCNC: 28 U/L (ref 10–50)
BASOPHILS # BLD AUTO: 0 10E3/UL (ref 0–0.2)
BASOPHILS NFR BLD AUTO: 1 %
BILIRUB SERPL-MCNC: <0.2 MG/DL
BUN SERPL-MCNC: 28.9 MG/DL (ref 6–20)
CALCIUM SERPL-MCNC: 9.4 MG/DL (ref 8.6–10)
CHLORIDE SERPL-SCNC: 102 MMOL/L (ref 98–107)
CREAT SERPL-MCNC: 0.81 MG/DL (ref 0.67–1.17)
DEPRECATED HCO3 PLAS-SCNC: 24 MMOL/L (ref 22–29)
EOSINOPHIL # BLD AUTO: 0.2 10E3/UL (ref 0–0.7)
EOSINOPHIL NFR BLD AUTO: 3 %
ERYTHROCYTE [DISTWIDTH] IN BLOOD BY AUTOMATED COUNT: 11.6 % (ref 10–15)
ETHANOL SERPL-MCNC: <0.01 G/DL
GFR SERPL CREATININE-BSD FRML MDRD: >90 ML/MIN/1.73M2
GLUCOSE BLDC GLUCOMTR-MCNC: 140 MG/DL (ref 70–99)
GLUCOSE SERPL-MCNC: 134 MG/DL (ref 70–99)
HCT VFR BLD AUTO: 46 % (ref 40–53)
HGB BLD-MCNC: 16.2 G/DL (ref 13.3–17.7)
HOLD SPECIMEN: NORMAL
HOLD SPECIMEN: NORMAL
IMM GRANULOCYTES # BLD: 0.1 10E3/UL
IMM GRANULOCYTES NFR BLD: 1 %
LIPASE SERPL-CCNC: 42 U/L (ref 13–60)
LYMPHOCYTES # BLD AUTO: 2.2 10E3/UL (ref 0.8–5.3)
LYMPHOCYTES NFR BLD AUTO: 29 %
MAGNESIUM SERPL-MCNC: 1.8 MG/DL (ref 1.7–2.3)
MCH RBC QN AUTO: 30.3 PG (ref 26.5–33)
MCHC RBC AUTO-ENTMCNC: 35.2 G/DL (ref 31.5–36.5)
MCV RBC AUTO: 86 FL (ref 78–100)
MONOCYTES # BLD AUTO: 0.6 10E3/UL (ref 0–1.3)
MONOCYTES NFR BLD AUTO: 8 %
NEUTROPHILS # BLD AUTO: 4.4 10E3/UL (ref 1.6–8.3)
NEUTROPHILS NFR BLD AUTO: 58 %
NRBC # BLD AUTO: 0 10E3/UL
NRBC BLD AUTO-RTO: 0 /100
PLATELET # BLD AUTO: 283 10E3/UL (ref 150–450)
POTASSIUM SERPL-SCNC: 3.9 MMOL/L (ref 3.4–5.3)
PROT SERPL-MCNC: 6.6 G/DL (ref 6.4–8.3)
RBC # BLD AUTO: 5.34 10E6/UL (ref 4.4–5.9)
SODIUM SERPL-SCNC: 139 MMOL/L (ref 136–145)
T4 FREE SERPL-MCNC: 1.17 NG/DL (ref 0.9–1.7)
TROPONIN T SERPL HS-MCNC: 18 NG/L
TSH SERPL DL<=0.005 MIU/L-ACNC: 4.21 UIU/ML (ref 0.3–4.2)
WBC # BLD AUTO: 7.5 10E3/UL (ref 4–11)

## 2023-06-03 PROCEDURE — 99291 CRITICAL CARE FIRST HOUR: CPT | Mod: 25

## 2023-06-03 PROCEDURE — 96361 HYDRATE IV INFUSION ADD-ON: CPT

## 2023-06-03 PROCEDURE — 84439 ASSAY OF FREE THYROXINE: CPT | Performed by: EMERGENCY MEDICINE

## 2023-06-03 PROCEDURE — 85025 COMPLETE CBC W/AUTO DIFF WBC: CPT | Performed by: EMERGENCY MEDICINE

## 2023-06-03 PROCEDURE — 250N000011 HC RX IP 250 OP 636: Performed by: EMERGENCY MEDICINE

## 2023-06-03 PROCEDURE — 84443 ASSAY THYROID STIM HORMONE: CPT | Performed by: EMERGENCY MEDICINE

## 2023-06-03 PROCEDURE — 258N000003 HC RX IP 258 OP 636: Performed by: EMERGENCY MEDICINE

## 2023-06-03 PROCEDURE — 96360 HYDRATION IV INFUSION INIT: CPT | Mod: 59

## 2023-06-03 PROCEDURE — 84484 ASSAY OF TROPONIN QUANT: CPT | Performed by: EMERGENCY MEDICINE

## 2023-06-03 PROCEDURE — 82077 ASSAY SPEC XCP UR&BREATH IA: CPT | Performed by: EMERGENCY MEDICINE

## 2023-06-03 PROCEDURE — 82962 GLUCOSE BLOOD TEST: CPT

## 2023-06-03 PROCEDURE — 93005 ELECTROCARDIOGRAM TRACING: CPT | Mod: XU

## 2023-06-03 PROCEDURE — 83690 ASSAY OF LIPASE: CPT | Performed by: EMERGENCY MEDICINE

## 2023-06-03 PROCEDURE — 36415 COLL VENOUS BLD VENIPUNCTURE: CPT | Performed by: EMERGENCY MEDICINE

## 2023-06-03 PROCEDURE — 999N000157 HC STATISTIC RCP TIME EA 10 MIN

## 2023-06-03 PROCEDURE — 83735 ASSAY OF MAGNESIUM: CPT | Performed by: EMERGENCY MEDICINE

## 2023-06-03 PROCEDURE — 80053 COMPREHEN METABOLIC PANEL: CPT | Performed by: EMERGENCY MEDICINE

## 2023-06-03 PROCEDURE — 99285 EMERGENCY DEPT VISIT HI MDM: CPT | Mod: 25

## 2023-06-03 PROCEDURE — 92960 CARDIOVERSION ELECTRIC EXT: CPT

## 2023-06-03 RX ORDER — PROPOFOL 10 MG/ML
INJECTION, EMULSION INTRAVENOUS DAILY PRN
Status: COMPLETED | OUTPATIENT
Start: 2023-06-03 | End: 2023-06-03

## 2023-06-03 RX ORDER — PROPOFOL 10 MG/ML
150 INJECTION, EMULSION INTRAVENOUS ONCE
Status: DISCONTINUED | OUTPATIENT
Start: 2023-06-03 | End: 2023-06-03 | Stop reason: HOSPADM

## 2023-06-03 RX ADMIN — PROPOFOL 80 MG: 10 INJECTION, EMULSION INTRAVENOUS at 01:48

## 2023-06-03 RX ADMIN — SODIUM CHLORIDE 1000 ML: 9 INJECTION, SOLUTION INTRAVENOUS at 01:35

## 2023-06-03 ASSESSMENT — ACTIVITIES OF DAILY LIVING (ADL): ADLS_ACUITY_SCORE: 35

## 2023-06-03 NOTE — ED PROVIDER NOTES
History     Chief Complaint:  Palpitations       HPI   Vladimir Gallego is a 38 year old male with a history of paroxysmal atrial fibrillation presenting with palpitations.  Patient reports this evening having a few alcoholic beverages and going out to dinner with friends.  He reports a mild upper abdominal tenderness though this resolved without intervention.  He reports roughly an hour prior to arrival developing palpitations which reminded him of when he was in atrial fibrillation.  He denies any fever, chills, chest pain, significant dyspnea, nausea, vomiting, current abdominal pain or other symptoms.  He does report regular alcohol use roughly 3 drinks daily though denies any history of alcohol withdrawal.  No reported drug use.  He is not on anticoagulation.      Independent Historian:   None - Patient Only    Review of External Notes:   12/23/22 ECHO  Interpretation Summary     Left ventricular systolic function is normal.The visual ejection fraction is  55-60%.  The right ventricular systolic function is normal.  There is mild (1+) mitral regurgitation.  The inferior vena cava was normal in size with preserved respiratory  variability.       Medications:    No current outpatient medications on file.      Past Medical History:    No past medical history on file.    Past Surgical History:    Past Surgical History:   Procedure Laterality Date     ABDOMEN SURGERY  2008?    Appendix     APPENDECTOMY  2008?        Physical Exam     Patient Vitals for the past 24 hrs:   BP Temp Temp src Pulse Resp SpO2 Weight   06/03/23 0142 -- -- -- -- -- -- 77.1 kg (170 lb)   06/03/23 0138 (!) 113/98 -- -- (!) 130 25 100 % --   06/03/23 0130 (!) 140/80 -- -- (!) 149 24 99 % --   06/03/23 0127 111/83 -- -- (!) 138 26 100 % --   06/03/23 0121 (!) 119/90 -- -- 78 -- 100 % --   06/03/23 0106 (!) 117/96 96.8  F (36  C) Temporal (!) 136 18 99 % --        Physical Exam  Nursing note and vitals reviewed.  Constitutional: Well  nourished.  Eyes: Conjunctiva normal.  Pupils are equal, round, and reactive to light.   ENT: Nose normal. Mucous membranes pink and moist.    Neck: Normal range of motion.  CVS: Irregularly irregular  Pulmonary: Lungs clear to auscultation bilaterally. No wheezes/rales/rhonchi.  GI: Abdomen soft. Nontender, nondistended. No rigidity or guarding.    MSK: No calf tenderness or swelling.  Neuro: Alert. Follows simple commands.  Skin: Skin is warm and dry. No rash noted.   Psychiatric: Normal affect.       Emergency Department Course     ECG #1  ECG taken at 0102, ECG read at 0102  Atrial fibrillation with rapid ventricular response   Nonspecific T wave abnormality    Rate 136 bpm. NV interval * ms. QRS duration 94 ms. QT/QTc 312/469 ms. P-R-T axes * 58 -24.     ECG #2 Post cardioversion  ECG taken at 0149, ECG read at 0150  Normal sinus rhythm    Rate 80 bpm. NV interval 146 ms. QRS duration 88 ms. QT/QTc 340/392 ms. P-R-T axes 58 65 28.         Laboratory:  Labs Ordered and Resulted from Time of ED Arrival to Time of ED Departure   TSH WITH FREE T4 REFLEX - Abnormal       Result Value    TSH 4.21 (*)    GLUCOSE BY METER - Abnormal    GLUCOSE BY METER POCT 140 (*)    COMPREHENSIVE METABOLIC PANEL - Abnormal    Sodium 139      Potassium 3.9      Chloride 102      Carbon Dioxide (CO2) 24      Anion Gap 13      Urea Nitrogen 28.9 (*)     Creatinine 0.81      Calcium 9.4      Glucose 134 (*)     Alkaline Phosphatase 75      AST 28      ALT 33      Protein Total 6.6      Albumin 4.4      Bilirubin Total <0.2      GFR Estimate >90     MAGNESIUM - Normal    Magnesium 1.8     TROPONIN T, HIGH SENSITIVITY - Normal    Troponin T, High Sensitivity 18     ETHYL ALCOHOL LEVEL - Normal    Alcohol ethyl <0.01     LIPASE - Normal    Lipase 42     T4 FREE - Normal    Free T4 1.17     CBC WITH PLATELETS AND DIFFERENTIAL    WBC Count 7.5      RBC Count 5.34      Hemoglobin 16.2      Hematocrit 46.0      MCV 86      MCH 30.3      MCHC  35.2      RDW 11.6      Platelet Count 283      % Neutrophils 58      % Lymphocytes 29      % Monocytes 8      % Eosinophils 3      % Basophils 1      % Immature Granulocytes 1      NRBCs per 100 WBC 0      Absolute Neutrophils 4.4      Absolute Lymphocytes 2.2      Absolute Monocytes 0.6      Absolute Eosinophils 0.2      Absolute Basophils 0.0      Absolute Immature Granulocytes 0.1      Absolute NRBCs 0.0          [facility]    -Cardioversion External    Date/Time: 6/3/2023 1:50 AM    Performed by: Helen Cook DO  Authorized by: Helen Cook DO    Risks, benefits and alternatives discussed.      PRE-PROCEDURE DETAILS:     Cardioversion basis:  Elective    Rhythm:  Atrial fibrillation    Electrode placement:  Anterior-posterior  Attempt one:     Cardioversion mode:  Synchronous    Waveform:  Biphasic    Shock (Joules):  200    Shock outcome:  Conversion to normal sinus rhythm  Post-procedure details:     Patient status:  Awake      PROCEDURE    Patient Tolerance:  [facility] well [facility]    Procedure: Procedural Sedation    Date/Time: 6/3/2023 1:53 AM    Performed by: Helen Cook DO  Authorized by: Helen Cook DO    Risks, benefits and alternatives discussed.    ED EVALUATION:      Assessment Time: 6/3/2023 12:56 AM      I have performed an Emergency Department Evaluation including taking a history and physical examination, this evaluation will be documented in the electronic medical record for this ED encounter.      ASA Class: Class 1- healthy patient    Mallampati: Grade 1- soft palate, uvula, tonsillar pillars, and posterior pharyngeal wall visible    NPO Status: appropriately NPO for procedure    UNIVERSAL PROTOCOL   Site Marked: NA  Prior Images Obtained and Reviewed:  Yes  Required items: Required blood products, implants, devices and special equipment available    Patient  identity confirmed:  Verbally with patient and arm band  Patient was reevaluated immediately before administering moderate or deep sedation or anesthesia  Confirmation Checklist:  Patient's identity using two indicators, relevant allergies, procedure was appropriate and matched the consent or emergent situation and correct equipment/implants were available  Time out: Immediately prior to the procedure a time out was called    Universal Protocol: the Joint Commission Universal Protocol was followed    Preparation: Patient was prepped and draped in usual sterile fashion      SEDATION  Patient Sedated: Yes    Sedation Type:  Moderate (conscious) sedation  Sedation:  See MAR for details and propofol  Vital signs: Vital signs monitored during sedation      PROCEDURE  Describe Procedure: Sedation was maintained until the procedure was complete. The patient was monitored by staff until recovered.  Patient Tolerance:  Patient tolerated the procedure well with no immediate complications  Length of time physician/provider present for 1:1 monitoring during sedation: 15         Emergency Department Course & Assessments:             Interventions:  Medications   0.9% sodium chloride BOLUS (1,000 mLs Intravenous $New Bag 6/3/23 0135)   propofol (DIPRIVAN) injection 10 mg/mL vial (has no administration in time range)   propofol (DIPRIVAN) injection 10 mg/mL vial (80 mg Intravenous $Given 6/3/23 0148)      Consultations/Discussion of Management or Tests:  None        Social Determinants of Health affecting care:   None    Disposition:  The patient was discharged to home.     Impression & Plan      [id number]-8-[id number] Score  (calculator)  Background  Calculates overall risk of atrial fibrillation related CVA based on 7 factors: Age>=65-75, CHF, Htn, CVA/TIA, DM, vascular disease, female  Data  38 year old year old male  does not have a problem list on file.  Criteria   Of possible 6 points for 5 possible  items  NEGATIVE    Interpretation  CBIZJ-1-Fwge Score: 0  CHADS Score 0: Aspirin 81 to 325 mg daily        Medical Decision Making:  Vladimir Gallego presents to the ED today with complaint of palpitations.  The patient is found to be in atrial fibrillation with RVR. There are no signs of cardiac ischemia and no signs or symptoms to suggest pulmonary embolism, pneumonia or other acute process.  Given that the duration since onset is known to be less than 48 hours, I did feel that the patient was safe to cardiovert in the ED and discussed with cardiology on call who was in agreement.  I discussed with the patient the risk of ~1% thromboembolism and stroke as well of the risks of sedation and the procedure and he consented to the procedure.  The cardioversion was well tolerated and successful with conversion back to normal sinus rhythm.  The patient was awake, tolerated po and well-appearing. [id number]=2.  We did discuss initiation of metoprolol and ASA though patient is wishing to defer and follow-up with cardiology.  Return precautions given.      Diagnosis:    ICD-10-CM    1. Atrial fibrillation with RVR (H)  I48.91          Critical Care time was 30 minutes for this patient excluding procedures.    Discharge Medications:  New Prescriptions    No medications on file          6/3/2023   Helen Cook, Helen Dahl,   06/03/23 0407

## 2023-06-03 NOTE — PROGRESS NOTES
An ETCO2 monitor was placed on the pt with 3LPM bled in. The Ambu bag, suction, and airways were setup and present in the room, but not needed. Pt was able to maintain airway throughout the procedure with no intervention needed. ETCO2 levels were maintained between 36-40.    RT Lisandra on 6/3/2023 at 1:58 AM

## 2023-06-03 NOTE — ED TRIAGE NOTES
Presents to triage with c/o palpitations that began around 2130. Patient said he ate too much at dinner and had abdominal pain afterwards so he went to bed. He woke up to let the dog out a while later and no longer had abdominal pain but had palpitations. Patient was in afib and had to be cardioverted in November but was not started on any medication at that time.

## 2023-06-05 LAB
ATRIAL RATE - MUSE: 159 BPM
ATRIAL RATE - MUSE: 80 BPM
DIASTOLIC BLOOD PRESSURE - MUSE: NORMAL MMHG
DIASTOLIC BLOOD PRESSURE - MUSE: NORMAL MMHG
INTERPRETATION ECG - MUSE: NORMAL
INTERPRETATION ECG - MUSE: NORMAL
P AXIS - MUSE: 58 DEGREES
P AXIS - MUSE: NORMAL DEGREES
PR INTERVAL - MUSE: 146 MS
PR INTERVAL - MUSE: NORMAL MS
QRS DURATION - MUSE: 88 MS
QRS DURATION - MUSE: 94 MS
QT - MUSE: 312 MS
QT - MUSE: 340 MS
QTC - MUSE: 392 MS
QTC - MUSE: 469 MS
R AXIS - MUSE: 58 DEGREES
R AXIS - MUSE: 65 DEGREES
SYSTOLIC BLOOD PRESSURE - MUSE: NORMAL MMHG
SYSTOLIC BLOOD PRESSURE - MUSE: NORMAL MMHG
T AXIS - MUSE: -24 DEGREES
T AXIS - MUSE: 28 DEGREES
VENTRICULAR RATE- MUSE: 136 BPM
VENTRICULAR RATE- MUSE: 80 BPM

## 2023-06-17 ENCOUNTER — HOSPITAL ENCOUNTER (EMERGENCY)
Facility: CLINIC | Age: 38
Discharge: HOME | End: 2023-06-17
Attending: EMERGENCY MEDICINE | Admitting: EMERGENCY MEDICINE
Payer: COMMERCIAL

## 2023-06-17 VITALS
DIASTOLIC BLOOD PRESSURE: 76 MMHG | SYSTOLIC BLOOD PRESSURE: 106 MMHG | TEMPERATURE: 96.7 F | WEIGHT: 174.6 LBS | HEART RATE: 66 BPM | BODY MASS INDEX: 27.34 KG/M2 | OXYGEN SATURATION: 97 % | RESPIRATION RATE: 20 BRPM

## 2023-06-17 DIAGNOSIS — I48.91 ATRIAL FIBRILLATION WITH RAPID VENTRICULAR RESPONSE (H): ICD-10-CM

## 2023-06-17 LAB
ANION GAP SERPL CALCULATED.3IONS-SCNC: 13 MMOL/L (ref 7–15)
BASOPHILS # BLD AUTO: 0 10E3/UL (ref 0–0.2)
BASOPHILS NFR BLD AUTO: 0 %
BUN SERPL-MCNC: 20.4 MG/DL (ref 6–20)
CALCIUM SERPL-MCNC: 9.2 MG/DL (ref 8.6–10)
CHLORIDE SERPL-SCNC: 107 MMOL/L (ref 98–107)
CREAT SERPL-MCNC: 0.84 MG/DL (ref 0.67–1.17)
DEPRECATED HCO3 PLAS-SCNC: 23 MMOL/L (ref 22–29)
EOSINOPHIL # BLD AUTO: 0.2 10E3/UL (ref 0–0.7)
EOSINOPHIL NFR BLD AUTO: 4 %
ERYTHROCYTE [DISTWIDTH] IN BLOOD BY AUTOMATED COUNT: 11.9 % (ref 10–15)
GFR SERPL CREATININE-BSD FRML MDRD: >90 ML/MIN/1.73M2
GLUCOSE SERPL-MCNC: 107 MG/DL (ref 70–99)
HCT VFR BLD AUTO: 46.3 % (ref 40–53)
HGB BLD-MCNC: 15.6 G/DL (ref 13.3–17.7)
HOLD SPECIMEN: NORMAL
IMM GRANULOCYTES # BLD: 0 10E3/UL
IMM GRANULOCYTES NFR BLD: 1 %
LYMPHOCYTES # BLD AUTO: 2.1 10E3/UL (ref 0.8–5.3)
LYMPHOCYTES NFR BLD AUTO: 39 %
MCH RBC QN AUTO: 30 PG (ref 26.5–33)
MCHC RBC AUTO-ENTMCNC: 33.7 G/DL (ref 31.5–36.5)
MCV RBC AUTO: 89 FL (ref 78–100)
MONOCYTES # BLD AUTO: 0.5 10E3/UL (ref 0–1.3)
MONOCYTES NFR BLD AUTO: 9 %
NEUTROPHILS # BLD AUTO: 2.6 10E3/UL (ref 1.6–8.3)
NEUTROPHILS NFR BLD AUTO: 47 %
NRBC # BLD AUTO: 0 10E3/UL
NRBC BLD AUTO-RTO: 0 /100
PLATELET # BLD AUTO: 261 10E3/UL (ref 150–450)
POTASSIUM SERPL-SCNC: 3.5 MMOL/L (ref 3.4–5.3)
RBC # BLD AUTO: 5.2 10E6/UL (ref 4.4–5.9)
SODIUM SERPL-SCNC: 143 MMOL/L (ref 136–145)
WBC # BLD AUTO: 5.5 10E3/UL (ref 4–11)

## 2023-06-17 PROCEDURE — 36415 COLL VENOUS BLD VENIPUNCTURE: CPT | Performed by: EMERGENCY MEDICINE

## 2023-06-17 PROCEDURE — 82310 ASSAY OF CALCIUM: CPT | Performed by: EMERGENCY MEDICINE

## 2023-06-17 PROCEDURE — 99285 EMERGENCY DEPT VISIT HI MDM: CPT | Mod: 25

## 2023-06-17 PROCEDURE — 96375 TX/PRO/DX INJ NEW DRUG ADDON: CPT | Mod: 59

## 2023-06-17 PROCEDURE — 93005 ELECTROCARDIOGRAM TRACING: CPT | Mod: 59

## 2023-06-17 PROCEDURE — 250N000011 HC RX IP 250 OP 636: Performed by: EMERGENCY MEDICINE

## 2023-06-17 PROCEDURE — 92960 CARDIOVERSION ELECTRIC EXT: CPT

## 2023-06-17 PROCEDURE — 85025 COMPLETE CBC W/AUTO DIFF WBC: CPT | Performed by: EMERGENCY MEDICINE

## 2023-06-17 PROCEDURE — 999N000157 HC STATISTIC RCP TIME EA 10 MIN

## 2023-06-17 PROCEDURE — 96374 THER/PROPH/DIAG INJ IV PUSH: CPT | Mod: 59

## 2023-06-17 RX ORDER — PROPOFOL 10 MG/ML
200 INJECTION, EMULSION INTRAVENOUS ONCE
Status: COMPLETED | OUTPATIENT
Start: 2023-06-17 | End: 2023-06-17

## 2023-06-17 RX ORDER — METOPROLOL SUCCINATE 25 MG/1
25 TABLET, EXTENDED RELEASE ORAL DAILY
Qty: 30 TABLET | Refills: 2 | Status: ON HOLD | OUTPATIENT
Start: 2023-06-17 | End: 2023-10-09

## 2023-06-17 RX ORDER — ONDANSETRON 2 MG/ML
4 INJECTION INTRAMUSCULAR; INTRAVENOUS ONCE
Status: DISCONTINUED | OUTPATIENT
Start: 2023-06-17 | End: 2023-06-17 | Stop reason: HOSPADM

## 2023-06-17 RX ORDER — DILTIAZEM HYDROCHLORIDE 5 MG/ML
20 INJECTION INTRAVENOUS ONCE
Status: COMPLETED | OUTPATIENT
Start: 2023-06-17 | End: 2023-06-17

## 2023-06-17 RX ORDER — LIDOCAINE 40 MG/G
CREAM TOPICAL
Status: DISCONTINUED | OUTPATIENT
Start: 2023-06-17 | End: 2023-06-17 | Stop reason: HOSPADM

## 2023-06-17 RX ADMIN — DILTIAZEM HYDROCHLORIDE 20 MG: 5 INJECTION INTRAVENOUS at 03:00

## 2023-06-17 RX ADMIN — PROPOFOL 80 MG: 10 INJECTION, EMULSION INTRAVENOUS at 04:18

## 2023-06-17 ASSESSMENT — ACTIVITIES OF DAILY LIVING (ADL)
ADLS_ACUITY_SCORE: 35
ADLS_ACUITY_SCORE: 35

## 2023-06-17 NOTE — ED PROVIDER NOTES
"  History     Chief Complaint:  Palpitations     HPI   Vladimir Gallego is a 38 year old male who presents with palpitations. He was here two weeks ago for the same issue at which time he underwent cardioversion for atrial fibrillation.  He was also seen in December and cardioverted at that time.. This morning he woke up to let his dog outside when he felt \"winded\" and like his heart was racing. He is not on any medications because he has not been able to get in to see a cardiologist. The earliest he can be seen by cardiology is October. He denies fevers, vomiting, and diarrhea. He also denies other medical problems. Denies drug use.    Independent Historian:   None - Patient Only    Review of External Notes:   I reviewed the ED note from 12/1/2022 as well as his ED visit from early this June.    Medications:    Patient not taking any medications.    Past Medical History:    Past Medical History:   Diagnosis Date     Paroxysmal atrial fibrillation      Past Surgical History:    Past Surgical History:   Procedure Laterality Date     APPENDECTOMY  2008?        Physical Exam     Patient Vitals for the past 24 hrs:   BP Temp Temp src Pulse Resp SpO2 Weight   06/17/23 0500 106/76 -- -- 66 20 97 % --   06/17/23 0445 108/79 -- -- 70 14 98 % --   06/17/23 0430 107/85 -- -- 75 23 96 % --   06/17/23 0425 103/78 -- -- 73  6 97 % --   06/17/23 0420 111/78 -- -- 77 27 96 % --   06/17/23 0400 105/75 -- -- 122 22 96 % --   06/17/23 0350 121/68 -- -- 86 20 96 % --   06/17/23 0330 104/77 -- -- 92 19 95 % --   06/17/23 0320 103/70 -- -- 73 18 97 % --   06/17/23 0300 111/86 -- --  144 18 99 % --   06/17/23 0250  132/111 -- --  144 20 -- --   06/17/23 0230  126/94 -- --  134 18 100 % --   06/17/23 0220  123/110 -- --  133 18 -- --   06/17/23 0210  -- -- -- 29 -- --   06/17/23 0156  206/192  96.7  F (35.9  C) Temporal  164 20 100 % 79.2 kg (174 lb 9.7 oz)      Physical Exam  Nursing note and vitals reviewed.  Constitutional: " Cooperative.   HENT:   Mouth/Throat: Mucous membranes are normal.   Cardiovascular: Tachycardic and irregularly irregular rhythm.  No murmur..  Pulmonary/Chest: Effort normal and breath sounds normal. No respiratory distress. No wheezes. No rales.   Abdominal: Soft. Normal appearance. No distension.   Neurological: Alert. Oriented X4  Skin: Skin is warm and dry.  Psychiatric: Normal mood and affect.        Emergency Department Course   ECG  ECG taken at 0156 , ECG read at 0200  Atrial fibrillation with rapid ventricular response.   Nonspecific T wave abnormality.    Rate 134 bpm. WA interval * ms. QRS duration 90 ms. QT/QTc 314/468 ms. P-R-T axes * 61 5.     ECG post cardioversion  ECG taken at 0419, ECG read at 0421  Normal sinus rhythm    Rate 68 bpm. WA interval 142 ms. QRS duration 96 ms. QT/QTc 344/365 ms. P-R-T axes 29 34 40.     Laboratory:  Labs Ordered and Resulted from Time of ED Arrival to Time of ED Departure   BASIC METABOLIC PANEL - Abnormal       Result Value    Sodium 143      Potassium 3.5      Chloride 107      Carbon Dioxide (CO2) 23      Anion Gap 13      Urea Nitrogen 20.4 (*)     Creatinine 0.84      Calcium 9.2      Glucose 107 (*)     GFR Estimate >90     CBC WITH PLATELETS AND DIFFERENTIAL    WBC Count 5.5      RBC Count 5.20      Hemoglobin 15.6      Hematocrit 46.3      MCV 89      MCH 30.0      MCHC 33.7      RDW 11.9      Platelet Count 261      % Neutrophils 47      % Lymphocytes 39      % Monocytes 9      % Eosinophils 4      % Basophils 0      % Immature Granulocytes 1      NRBCs per 100 WBC 0      Absolute Neutrophils 2.6      Absolute Lymphocytes 2.1      Absolute Monocytes 0.5      Absolute Eosinophils 0.2      Absolute Basophils 0.0      Absolute Immature Granulocytes 0.0      Absolute NRBCs 0.0        Procedures     Sedation     Procedure: Procedural Sedation    Sedation Level: Deep    Indication: Cardioversion    Consent: Verbal from Patient     Universal protocol: Universal  protocol was followed and time out conducted just prior to starting procedure, confirming patient identity, site/side, procedure, patient position, and availability of correct equipment and implants.     Last PO Intake: Emergent procedure    ASA Class: Class 1 - A normal healthy patient     Exam:  Mallampati:  Grade 1 - Soft palate, uvula, and pillars visible    Lungs: Clear   Heart: Regular rate and rhythm     Medication: Propofol  Dose: 80 mg     Monitoring:  Monitoring consisted of heart rate, cardiac, continuous pulse oximetry, frequent blood pressure checks.   There was constant attendance by RN until patient recovered and constant attendance by physician until patient stable.   Intubation and emergency airway equipment available.     Response: Vital signs stable, oxygen saturations greater than 92%       Patient Status: Post procedure patient was alert.     Total Physician Drug Administration / Monitoring Time: 10 minutes.     Patient was monitored during recovery and returned to pre-procedure baseline.     Electrical Cardioversion         Procedure: Electrical Cardioversion        Indication: Atrial Fibrillation     Consent: Verbal from Patient     Universal Protocol: Universal protocol was followed and time out conducted just prior to starting procedure, confirming patient identity, site/side, procedure, patient position, and availability of correct equipment and implants.      Anesthesia/Sedation: None     Procedure Detail:   Electrodes were placed: Anterior/posterior  Trial #1: Synchronized Cardioversion at 150 Joules   Cardioversion was successful      Patient Status:  The patient tolerated the procedure well: Yes. There were no complications.     Interventions:  Medications   ondansetron (ZOFRAN) injection 4 mg   0.9% sodium chloride BOLUS 1 L   diltiazem (CARDIZEM) injection 20 mg (20 mg Intravenous $Given 6/17/23 0300)   propofol (DIPRIVAN) injection 10 mg/mL vial (80 mg Intravenous $Given 6/17/23  0418)      Assessments:  021 I obtained history and examined the patient   040 I rechecked the patient and explained findings.     Independent Interpretation (X-rays, CTs, rhythm strip):  None    Consultations/Discussion of Management or Tests:  0505 I spoke with Dr. Bonilla of cardiology regarding the patients presentation.        Social Determinants of Health affecting care:   None    Disposition:  The patient was discharged to home.     Impression & Plan      Medical Decision Makin-year-old gentleman who presents for the third time in the past 6 months with atrial fibrillation.  He underwent successful cardioversion once again in the ER [facility].  Unfortunately he has not been able to secure an outpatient cardiology appointment until next October.  I discussed the case with the on-call cardiologist who recommended starting Toprol daily.  His [id number] score is 0 so we would not start anticoagulation at this time.  I did place an cardiology  consult order to hopefully expedite his outpatient follow-up.  Previous labs including thyroid studies were reviewed and I do not feel the need to be repeated.  Patient is comfortable with this discussion will be discharged home    Diagnosis:    ICD-10-CM   1. Atrial fibrillation with rapid ventricular response  I48.91      Discharge Medications:  New Prescriptions    METOPROLOL SUCCINATE ER (TOPROL XL) 25 MG 24 HR TABLET    Take 1 tablet (25 mg) by mouth daily      Scribe Disclosure:  Yohana TREADWELL, am serving as a scribe at 3:11 AM on 2023 to document services personally performed by Delon Perales MD based on my observations and the provider's statements to me.   2023   Delon Pearles MD Amdahl, John, MD  [phone]

## 2023-06-17 NOTE — PROGRESS NOTES
An ETCO2 monitor was placed on the pt with 3 LPM bled in. The Ambu bag, suction, and airways were setup and present in the room, but not needed. Pt was able to maintain airway throughout the procedure with no intervention needed. ETCO2 levels were maintained between 36-41.    RT Lisandra on 6/17/2023 at 4:28 AM

## 2023-06-17 NOTE — SEDATION DOCUMENTATION
Patient tolerated procedure well, alert and orientated, wife at bedside, patient remains in NSR, update given to primary RN

## 2023-06-17 NOTE — ED TRIAGE NOTES
Pt presents to the ED with palpitations and SOB. Pt states he was seen in the ED twice before for a fib RVR and cardioverted. He has not been seen at cardiology yet because they are booked. Not taking any meds for rate control. Not on thinner.  in triage.     Triage Assessment     Row Name 06/17/23 0152       Triage Assessment (Adult)    Airway WDL WDL       Respiratory WDL    Respiratory WDL WDL       Skin Circulation/Temperature WDL    Skin Circulation/Temperature WDL WDL       Cardiac WDL    Cardiac WDL X;rhythm    Pulse Rate & Regularity tachycardic       Peripheral/Neurovascular WDL    Peripheral Neurovascular WDL WDL       Cognitive/Neuro/Behavioral WDL    Cognitive/Neuro/Behavioral WDL WDL

## 2023-06-19 LAB
ATRIAL RATE - MUSE: 156 BPM
ATRIAL RATE - MUSE: 68 BPM
DIASTOLIC BLOOD PRESSURE - MUSE: NORMAL MMHG
DIASTOLIC BLOOD PRESSURE - MUSE: NORMAL MMHG
INTERPRETATION ECG - MUSE: NORMAL
INTERPRETATION ECG - MUSE: NORMAL
P AXIS - MUSE: 29 DEGREES
P AXIS - MUSE: NORMAL DEGREES
PR INTERVAL - MUSE: 142 MS
PR INTERVAL - MUSE: NORMAL MS
QRS DURATION - MUSE: 90 MS
QRS DURATION - MUSE: 96 MS
QT - MUSE: 314 MS
QT - MUSE: 344 MS
QTC - MUSE: 365 MS
QTC - MUSE: 468 MS
R AXIS - MUSE: 34 DEGREES
R AXIS - MUSE: 61 DEGREES
SYSTOLIC BLOOD PRESSURE - MUSE: NORMAL MMHG
SYSTOLIC BLOOD PRESSURE - MUSE: NORMAL MMHG
T AXIS - MUSE: 40 DEGREES
T AXIS - MUSE: 5 DEGREES
VENTRICULAR RATE- MUSE: 134 BPM
VENTRICULAR RATE- MUSE: 68 BPM

## 2023-07-07 ENCOUNTER — OFFICE VISIT (OUTPATIENT)
Dept: CARDIOLOGY | Facility: CLINIC | Age: 38
End: 2023-07-07
Payer: COMMERCIAL

## 2023-07-07 VITALS
DIASTOLIC BLOOD PRESSURE: 74 MMHG | HEIGHT: 67 IN | HEART RATE: 76 BPM | BODY MASS INDEX: 27.78 KG/M2 | SYSTOLIC BLOOD PRESSURE: 115 MMHG | WEIGHT: 177 LBS

## 2023-07-07 DIAGNOSIS — I48.91 ATRIAL FIBRILLATION WITH RAPID VENTRICULAR RESPONSE (H): ICD-10-CM

## 2023-07-07 PROCEDURE — 99205 OFFICE O/P NEW HI 60 MIN: CPT | Performed by: INTERNAL MEDICINE

## 2023-07-07 RX ORDER — OMEGA-3 FATTY ACIDS/FISH OIL 300-1000MG
200 CAPSULE ORAL EVERY 4 HOURS PRN
Status: ON HOLD | COMMUNITY
End: 2023-07-31

## 2023-07-07 NOTE — LETTER
7/7/2023    Physician No Ref-Primary  No address on file    RE: Vladimir Gallego       Dear Colleague,     I had the pleasure of seeing Vladimir Gallego in the [facility].    Electrophysiology Clinic Progress Note    Vladimir Gallego MRN# [id number]   YOB: 1985 Age: 38 year old     Primary cardiologist: Dr. Rosa         Assessment and Plan   Thank you for allowing me to participate in the care of this very delightful patient.  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  Since then he had additional cardioversions with the last one being a week ago.  He had a few drinks both times.  Patient drinks socially only and actually the amount of drinking is less than a year ago when he did not have A-fib.  Patient is known to have mildly enlarged left atrium but has preserved biventricular function.  He has no family history of atrial fibrillation in his parents or siblings.    We discussed at length about the cause of his A-fib in this case it appears to be idiopathic although it can be triggered by alcohol drinking even after a few drinks only.  A-fib is not uncommon in long distant runners however.  It is very likely that Vladimir has a substrate or propensity to have atrial fibrillation even without drinking alcohol as mentioned the amount of drinking is actually less than a year ago when he did not have A-fib.    We also discussed about potential complications including CVA and tachycardia induced cardiomyopathy which is quite low for him.    Finally, we discussed rhythm control strategy with either antiarrhythmic drug versus an ablation.  Given his young age and potential side effects of taking long-term antiarrhythmic drug and high success rate of an ablation I would prefer the latter option.  I went over the procedure in  "details with risk including but not limited to vascular injury CVA and cardiac perforation.  I quoted him the long-term success rate of 85% with 1 procedure and the patient would need to be on an anticoagulant for about 2 months after the ablation.  I also would like to a cardiac CT before hand if possible.             Review of Systems     12-pt ROS is negative except for as noted in the HPI.          Physical Exam     Vitals: /74   Pulse 76   Ht 1.702 m (5' 7\")   Wt 80.3 kg (177 lb)   BMI 27.72 kg/m    Wt Readings from Last 10 Encounters:   07/07/23 80.3 kg (177 lb)   06/17/23 79.2 kg (174 lb 9.7 oz)   06/03/23 77.1 kg (170 lb)   05/24/23 77.7 kg (171 lb 6.4 oz)   05/22/23 77.1 kg (170 lb)   12/16/22 81.5 kg (179 lb 9.6 oz)   12/15/22 82.6 kg (182 lb)   01/29/21 85.3 kg (188 lb)       Constitutional:  Patient is pleasant, alert, cooperative, and in NAD.  HEENT:  NCAT. PERRLA. EOM's intact.   Neck:  CVP appears normal. No carotid bruits.   Pulmonary: Normal respiratory effort. CTAB.   Cardiac: RRR, normal S1/S2, no S3/S4, no murmur or rub.   Abdomen:  Non-tender abdomen, no hepatosplenomegaly appreciated.   Vascular: Pulses in the upper and lower extremities are 2+ and equal bilaterally.  Extremities: No edema, erythema, cyanosis or tenderness appreciated.  Skin:  No rashes or lesions appreciated.   Neurological:  No gross motor or sensory deficits.   Psych: Appropriate affect.          Data   Labs reviewed:  Recent Labs   Lab Test 06/03/23  0128 12/16/22  1527   LDL  --  126*   HDL  --  49   NHDL  --  146*   CHOL  --  195   TRIG  --  102   TSH 4.21* 1.65       Lab Results   Component Value Date    WBC 5.5 06/17/2023    WBC 5.6 01/29/2021    RBC 5.20 06/17/2023    RBC 5.12 01/29/2021    HGB 15.6 06/17/2023    HGB 15.4 01/29/2021    HCT 46.3 06/17/2023    HCT 45.2 01/29/2021    MCV 89 06/17/2023    MCV 88 01/29/2021    MCH 30.0 06/17/2023    MCH 30.1 01/29/2021    MCHC 33.7 06/17/2023    MCHC 34.1 " 01/29/2021    RDW 11.9 06/17/2023    RDW 11.5 01/29/2021     06/17/2023     01/29/2021       Lab Results   Component Value Date     06/17/2023     01/29/2021    POTASSIUM 3.5 06/17/2023    POTASSIUM 3.8 01/29/2021    CHLORIDE 107 06/17/2023    CHLORIDE 107 01/29/2021    CO2 23 06/17/2023    CO2 28 01/29/2021    ANIONGAP 13 06/17/2023    ANIONGAP 4 01/29/2021     (H) 06/17/2023     (H) 06/03/2023    GLC 83 01/29/2021    BUN 20.4 (H) 06/17/2023    BUN 13 01/29/2021    CR 0.84 06/17/2023    CR 0.95 01/29/2021    GFRESTIMATED >90 06/17/2023    GFRESTIMATED >90 01/29/2021    GFRESTBLACK >90 01/29/2021    SHAI 9.2 06/17/2023    SHAI 9.3 01/29/2021      Lab Results   Component Value Date    AST 28 06/03/2023    AST 24 01/29/2021    ALT 33 06/03/2023    ALT 34 01/29/2021       No results found for: A1C    No results found for: INR         Problem List   There is no problem list on file for this patient.           Medications     Current Outpatient Medications   Medication Sig Dispense Refill    ibuprofen (ADVIL/MOTRIN) 200 MG capsule Take 200 mg by mouth every 4 hours as needed for fever      metoprolol succinate ER (TOPROL XL) 25 MG 24 hr tablet Take 1 tablet (25 mg) by mouth daily 30 tablet 2            Past Medical History     Past Medical History:   Diagnosis Date    Paroxysmal atrial fibrillation      Past Surgical History:   Procedure Laterality Date    APPENDECTOMY  2008?     Family History   Problem Relation Age of Onset    Hypertension Mother     Hypertension Father     Factor V Leiden deficiency Brother     Factor V Leiden deficiency Paternal Half-Sister      Social History     Socioeconomic History    Marital status:      Spouse name: Not on file    Number of children: Not on file    Years of education: Not on file    Highest education level: Not on file   Occupational History    Not on file   Tobacco Use    Smoking status: Never    Smokeless tobacco: Never   Substance  and Sexual Activity    Alcohol use: Yes     Comment: 0-3-4 per day    Drug use: Never    Sexual activity: Yes     Partners: Female     Birth control/protection: Condom   Other Topics Concern    Parent/sibling w/ CABG, MI or angioplasty before 65F 55M? No   Social History Narrative    Not on file     Social Determinants of Health     Financial Resource Strain: Not on file   Food Insecurity: Not on file   Transportation Needs: Not on file   Physical Activity: Not on file   Stress: Not on file   Social Connections: Not on file   Intimate Partner Violence: Not on file   Housing Stability: Not on file            Allergies   Bee venom    Today's clinic visit entailed:  Discussion of management or test interpretation with external physician/other qualified healthcare professional/appropriate source - ecg, echo  45 minutes spent by me on the date of the encounter doing chart review, history and exam, documentation and further activities per the note  Provider  Link to [facility] Help Grid     The level of medical decision making during this visit was of moderate complexity.      Thank you for allowing me to participate in the care of your patient.      Sincerely,     William Medeiros MD      [facility]  cc:   Delon Perales MD  [facility]  [address] [place] [address]

## 2023-07-07 NOTE — H&P (VIEW-ONLY)
Electrophysiology Clinic Progress Note    Vladimir Gallego MRN# [id number]   YOB: 1985 Age: 38 year old     Primary cardiologist: Dr. Rosa         Assessment and Plan   Thank you for allowing me to participate in the care of this very delightful patient.  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  Since then he had additional cardioversions with the last one being a week ago.  He had a few drinks both times.  Patient drinks socially only and actually the amount of drinking is less than a year ago when he did not have A-fib.  Patient is known to have mildly enlarged left atrium but has preserved biventricular function.  He has no family history of atrial fibrillation in his parents or siblings.    We discussed at length about the cause of his A-fib in this case it appears to be idiopathic although it can be triggered by alcohol drinking even after a few drinks only.  A-fib is not uncommon in long distant runners however.  It is very likely that Vladimir has a substrate or propensity to have atrial fibrillation even without drinking alcohol as mentioned the amount of drinking is actually less than a year ago when he did not have A-fib.    We also discussed about potential complications including CVA and tachycardia induced cardiomyopathy which is quite low for him.    Finally, we discussed rhythm control strategy with either antiarrhythmic drug versus an ablation.  Given his young age and potential side effects of taking long-term antiarrhythmic drug and high success rate of an ablation I would prefer the latter option.  I went over the procedure in details with risk including but not limited to vascular injury CVA and cardiac perforation.  I quoted him the long-term success rate of 85% with 1 procedure and the patient would need to be on an  "anticoagulant for about 2 months after the ablation.  I also would like to a cardiac CT before hand if possible.             Review of Systems     12-pt ROS is negative except for as noted in the HPI.          Physical Exam     Vitals: /74   Pulse 76   Ht 1.702 m (5' 7\")   Wt 80.3 kg (177 lb)   BMI 27.72 kg/m    Wt Readings from Last 10 Encounters:   07/07/23 80.3 kg (177 lb)   06/17/23 79.2 kg (174 lb 9.7 oz)   06/03/23 77.1 kg (170 lb)   05/24/23 77.7 kg (171 lb 6.4 oz)   05/22/23 77.1 kg (170 lb)   12/16/22 81.5 kg (179 lb 9.6 oz)   12/15/22 82.6 kg (182 lb)   01/29/21 85.3 kg (188 lb)       Constitutional:  Patient is pleasant, alert, cooperative, and in NAD.  HEENT:  NCAT. PERRLA. EOM's intact.   Neck:  CVP appears normal. No carotid bruits.   Pulmonary: Normal respiratory effort. CTAB.   Cardiac: RRR, normal S1/S2, no S3/S4, no murmur or rub.   Abdomen:  Non-tender abdomen, no hepatosplenomegaly appreciated.   Vascular: Pulses in the upper and lower extremities are 2+ and equal bilaterally.  Extremities: No edema, erythema, cyanosis or tenderness appreciated.  Skin:  No rashes or lesions appreciated.   Neurological:  No gross motor or sensory deficits.   Psych: Appropriate affect.          Data   Labs reviewed:  Recent Labs   Lab Test 06/03/23  0128 12/16/22  1527   LDL  --  126*   HDL  --  49   NHDL  --  146*   CHOL  --  195   TRIG  --  102   TSH 4.21* 1.65       Lab Results   Component Value Date    WBC 5.5 06/17/2023    WBC 5.6 01/29/2021    RBC 5.20 06/17/2023    RBC 5.12 01/29/2021    HGB 15.6 06/17/2023    HGB 15.4 01/29/2021    HCT 46.3 06/17/2023    HCT 45.2 01/29/2021    MCV 89 06/17/2023    MCV 88 01/29/2021    MCH 30.0 06/17/2023    MCH 30.1 01/29/2021    MCHC 33.7 06/17/2023    MCHC 34.1 01/29/2021    RDW 11.9 06/17/2023    RDW 11.5 01/29/2021     06/17/2023     01/29/2021       Lab Results   Component Value Date     06/17/2023     01/29/2021    POTASSIUM 3.5 " 06/17/2023    POTASSIUM 3.8 01/29/2021    CHLORIDE 107 06/17/2023    CHLORIDE 107 01/29/2021    CO2 23 06/17/2023    CO2 28 01/29/2021    ANIONGAP 13 06/17/2023    ANIONGAP 4 01/29/2021     (H) 06/17/2023     (H) 06/03/2023    GLC 83 01/29/2021    BUN 20.4 (H) 06/17/2023    BUN 13 01/29/2021    CR 0.84 06/17/2023    CR 0.95 01/29/2021    GFRESTIMATED >90 06/17/2023    GFRESTIMATED >90 01/29/2021    GFRESTBLACK >90 01/29/2021    SHAI 9.2 06/17/2023    SHAI 9.3 01/29/2021      Lab Results   Component Value Date    AST 28 06/03/2023    AST 24 01/29/2021    ALT 33 06/03/2023    ALT 34 01/29/2021       No results found for: A1C    No results found for: INR         Problem List   There is no problem list on file for this patient.           Medications     Current Outpatient Medications   Medication Sig Dispense Refill     ibuprofen (ADVIL/MOTRIN) 200 MG capsule Take 200 mg by mouth every 4 hours as needed for fever       metoprolol succinate ER (TOPROL XL) 25 MG 24 hr tablet Take 1 tablet (25 mg) by mouth daily 30 tablet 2            Past Medical History     Past Medical History:   Diagnosis Date     Paroxysmal atrial fibrillation      Past Surgical History:   Procedure Laterality Date     APPENDECTOMY  2008?     Family History   Problem Relation Age of Onset     Hypertension Mother      Hypertension Father      Factor V Leiden deficiency Brother      Factor V Leiden deficiency Paternal Half-Sister      Social History     Socioeconomic History     Marital status:      Spouse name: Not on file     Number of children: Not on file     Years of education: Not on file     Highest education level: Not on file   Occupational History     Not on file   Tobacco Use     Smoking status: Never     Smokeless tobacco: Never   Substance and Sexual Activity     Alcohol use: Yes     Comment: 0-3-4 per day     Drug use: Never     Sexual activity: Yes     Partners: Female     Birth control/protection: Condom   Other Topics  Concern     Parent/sibling w/ CABG, MI or angioplasty before 65F 55M? No   Social History Narrative     Not on file     Social Determinants of Health     Financial Resource Strain: Not on file   Food Insecurity: Not on file   Transportation Needs: Not on file   Physical Activity: Not on file   Stress: Not on file   Social Connections: Not on file   Intimate Partner Violence: Not on file   Housing Stability: Not on file            Allergies   Bee venom    Today's clinic visit entailed:  Discussion of management or test interpretation with external physician/other qualified healthcare professional/appropriate source - ecg, echo  45 minutes spent by me on the date of the encounter doing chart review, history and exam, documentation and further activities per the note  Provider  Link to MDM Help Grid     The level of medical decision making during this visit was of moderate complexity.

## 2023-07-07 NOTE — PROGRESS NOTES
Electrophysiology Clinic Progress Note    Vladimir Gallego MRN# [id number]   YOB: 1985 Age: 38 year old     Primary cardiologist: Dr. Rosa         Assessment and Plan   Thank you for allowing me to participate in the care of this very delightful patient.  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  Since then he had additional cardioversions with the last one being a week ago.  He had a few drinks both times.  Patient drinks socially only and actually the amount of drinking is less than a year ago when he did not have A-fib.  Patient is known to have mildly enlarged left atrium but has preserved biventricular function.  He has no family history of atrial fibrillation in his parents or siblings.    We discussed at length about the cause of his A-fib in this case it appears to be idiopathic although it can be triggered by alcohol drinking even after a few drinks only.  A-fib is not uncommon in long distant runners however.  It is very likely that Vladimir has a substrate or propensity to have atrial fibrillation even without drinking alcohol as mentioned the amount of drinking is actually less than a year ago when he did not have A-fib.    We also discussed about potential complications including CVA and tachycardia induced cardiomyopathy which is quite low for him.    Finally, we discussed rhythm control strategy with either antiarrhythmic drug versus an ablation.  Given his young age and potential side effects of taking long-term antiarrhythmic drug and high success rate of an ablation I would prefer the latter option.  I went over the procedure in details with risk including but not limited to vascular injury CVA and cardiac perforation.  I quoted him the long-term success rate of 85% with 1 procedure and the patient would need to be on an  "anticoagulant for about 2 months after the ablation.  I also would like to a cardiac CT before hand if possible.             Review of Systems     12-pt ROS is negative except for as noted in the HPI.          Physical Exam     Vitals: /74   Pulse 76   Ht 1.702 m (5' 7\")   Wt 80.3 kg (177 lb)   BMI 27.72 kg/m    Wt Readings from Last 10 Encounters:   07/07/23 80.3 kg (177 lb)   06/17/23 79.2 kg (174 lb 9.7 oz)   06/03/23 77.1 kg (170 lb)   05/24/23 77.7 kg (171 lb 6.4 oz)   05/22/23 77.1 kg (170 lb)   12/16/22 81.5 kg (179 lb 9.6 oz)   12/15/22 82.6 kg (182 lb)   01/29/21 85.3 kg (188 lb)       Constitutional:  Patient is pleasant, alert, cooperative, and in NAD.  HEENT:  NCAT. PERRLA. EOM's intact.   Neck:  CVP appears normal. No carotid bruits.   Pulmonary: Normal respiratory effort. CTAB.   Cardiac: RRR, normal S1/S2, no S3/S4, no murmur or rub.   Abdomen:  Non-tender abdomen, no hepatosplenomegaly appreciated.   Vascular: Pulses in the upper and lower extremities are 2+ and equal bilaterally.  Extremities: No edema, erythema, cyanosis or tenderness appreciated.  Skin:  No rashes or lesions appreciated.   Neurological:  No gross motor or sensory deficits.   Psych: Appropriate affect.          Data   Labs reviewed:  Recent Labs   Lab Test 06/03/23  0128 12/16/22  1527   LDL  --  126*   HDL  --  49   NHDL  --  146*   CHOL  --  195   TRIG  --  102   TSH 4.21* 1.65       Lab Results   Component Value Date    WBC 5.5 06/17/2023    WBC 5.6 01/29/2021    RBC 5.20 06/17/2023    RBC 5.12 01/29/2021    HGB 15.6 06/17/2023    HGB 15.4 01/29/2021    HCT 46.3 06/17/2023    HCT 45.2 01/29/2021    MCV 89 06/17/2023    MCV 88 01/29/2021    MCH 30.0 06/17/2023    MCH 30.1 01/29/2021    MCHC 33.7 06/17/2023    MCHC 34.1 01/29/2021    RDW 11.9 06/17/2023    RDW 11.5 01/29/2021     06/17/2023     01/29/2021       Lab Results   Component Value Date     06/17/2023     01/29/2021    POTASSIUM 3.5 " 06/17/2023    POTASSIUM 3.8 01/29/2021    CHLORIDE 107 06/17/2023    CHLORIDE 107 01/29/2021    CO2 23 06/17/2023    CO2 28 01/29/2021    ANIONGAP 13 06/17/2023    ANIONGAP 4 01/29/2021     (H) 06/17/2023     (H) 06/03/2023    GLC 83 01/29/2021    BUN 20.4 (H) 06/17/2023    BUN 13 01/29/2021    CR 0.84 06/17/2023    CR 0.95 01/29/2021    GFRESTIMATED >90 06/17/2023    GFRESTIMATED >90 01/29/2021    GFRESTBLACK >90 01/29/2021    SHAI 9.2 06/17/2023    SHAI 9.3 01/29/2021      Lab Results   Component Value Date    AST 28 06/03/2023    AST 24 01/29/2021    ALT 33 06/03/2023    ALT 34 01/29/2021       No results found for: A1C    No results found for: INR         Problem List   There is no problem list on file for this patient.           Medications     Current Outpatient Medications   Medication Sig Dispense Refill     ibuprofen (ADVIL/MOTRIN) 200 MG capsule Take 200 mg by mouth every 4 hours as needed for fever       metoprolol succinate ER (TOPROL XL) 25 MG 24 hr tablet Take 1 tablet (25 mg) by mouth daily 30 tablet 2            Past Medical History     Past Medical History:   Diagnosis Date     Paroxysmal atrial fibrillation      Past Surgical History:   Procedure Laterality Date     APPENDECTOMY  2008?     Family History   Problem Relation Age of Onset     Hypertension Mother      Hypertension Father      Factor V Leiden deficiency Brother      Factor V Leiden deficiency Paternal Half-Sister      Social History     Socioeconomic History     Marital status:      Spouse name: Not on file     Number of children: Not on file     Years of education: Not on file     Highest education level: Not on file   Occupational History     Not on file   Tobacco Use     Smoking status: Never     Smokeless tobacco: Never   Substance and Sexual Activity     Alcohol use: Yes     Comment: 0-3-4 per day     Drug use: Never     Sexual activity: Yes     Partners: Female     Birth control/protection: Condom   Other Topics  Concern     Parent/sibling w/ CABG, MI or angioplasty before 65F 55M? No   Social History Narrative     Not on file     Social Determinants of Health     Financial Resource Strain: Not on file   Food Insecurity: Not on file   Transportation Needs: Not on file   Physical Activity: Not on file   Stress: Not on file   Social Connections: Not on file   Intimate Partner Violence: Not on file   Housing Stability: Not on file            Allergies   Bee venom    Today's clinic visit entailed:  Discussion of management or test interpretation with external physician/other qualified healthcare professional/appropriate source - ecg, echo  45 minutes spent by me on the date of the encounter doing chart review, history and exam, documentation and further activities per the note  Provider  Link to MDM Help Grid     The level of medical decision making during this visit was of moderate complexity.

## 2023-07-17 ENCOUNTER — HOSPITAL ENCOUNTER (OUTPATIENT)
Dept: CARDIOLOGY | Facility: CLINIC | Age: 38
Discharge: HOME | End: 2023-07-17
Attending: INTERNAL MEDICINE | Admitting: INTERNAL MEDICINE
Payer: COMMERCIAL

## 2023-07-17 ENCOUNTER — TELEPHONE (OUTPATIENT)
Dept: CARDIOLOGY | Facility: CLINIC | Age: 38
End: 2023-07-17

## 2023-07-17 DIAGNOSIS — I48.91 ATRIAL FIBRILLATION WITH RAPID VENTRICULAR RESPONSE (H): ICD-10-CM

## 2023-07-17 PROCEDURE — 250N000011 HC RX IP 250 OP 636: Performed by: INTERNAL MEDICINE

## 2023-07-17 PROCEDURE — 75572 CT HRT W/3D IMAGE: CPT | Mod: 26 | Performed by: INTERNAL MEDICINE

## 2023-07-17 PROCEDURE — 75572 CT HRT W/3D IMAGE: CPT

## 2023-07-17 RX ORDER — DIPHENHYDRAMINE HYDROCHLORIDE 50 MG/ML
25-50 INJECTION INTRAMUSCULAR; INTRAVENOUS
Status: DISCONTINUED | OUTPATIENT
Start: 2023-07-17 | End: 2023-07-18 | Stop reason: HOSPADM

## 2023-07-17 RX ORDER — ONDANSETRON 2 MG/ML
4 INJECTION INTRAMUSCULAR; INTRAVENOUS
Status: DISCONTINUED | OUTPATIENT
Start: 2023-07-17 | End: 2023-07-18 | Stop reason: HOSPADM

## 2023-07-17 RX ORDER — ACYCLOVIR 200 MG/1
0-1 CAPSULE ORAL
Status: DISCONTINUED | OUTPATIENT
Start: 2023-07-17 | End: 2023-07-18 | Stop reason: HOSPADM

## 2023-07-17 RX ORDER — DIPHENHYDRAMINE HCL 25 MG
25 CAPSULE ORAL
Status: DISCONTINUED | OUTPATIENT
Start: 2023-07-17 | End: 2023-07-18 | Stop reason: HOSPADM

## 2023-07-17 RX ORDER — METHYLPREDNISOLONE SODIUM SUCCINATE 125 MG/2ML
125 INJECTION, POWDER, LYOPHILIZED, FOR SOLUTION INTRAMUSCULAR; INTRAVENOUS
Status: DISCONTINUED | OUTPATIENT
Start: 2023-07-17 | End: 2023-07-18 | Stop reason: HOSPADM

## 2023-07-17 RX ORDER — IOPAMIDOL 755 MG/ML
500 INJECTION, SOLUTION INTRAVASCULAR ONCE
Status: COMPLETED | OUTPATIENT
Start: 2023-07-17 | End: 2023-07-17

## 2023-07-17 RX ADMIN — IOPAMIDOL 80 ML: 755 INJECTION, SOLUTION INTRAVENOUS at 14:04

## 2023-07-17 NOTE — TELEPHONE ENCOUNTER
[facility] Call Center    Phone Message    May a detailed message be left on voicemail: yes     Reason for Call: Other: patient is calling as his employer is needing a letter saying he has no restrictions up until surgery and that he is okay to work, please advise with a work clearance letter, thank you.   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  Action Taken: Other: cardiology    Travel Screening: Not Applicable   Thank you!  [facility]

## 2023-07-21 ENCOUNTER — DOCUMENTATION ONLY (OUTPATIENT)
Dept: OTHER | Facility: CLINIC | Age: 38
End: 2023-07-21
Payer: COMMERCIAL

## 2023-07-28 ENCOUNTER — TELEPHONE (OUTPATIENT)
Dept: MEDSURG UNIT | Facility: CLINIC | Age: 38
End: 2023-07-28
Payer: COMMERCIAL

## 2023-07-28 ENCOUNTER — TELEPHONE (OUTPATIENT)
Dept: CARDIOLOGY | Facility: CLINIC | Age: 38
End: 2023-07-28
Payer: COMMERCIAL

## 2023-07-28 DIAGNOSIS — I48.91 ATRIAL FIBRILLATION WITH RAPID VENTRICULAR RESPONSE (H): Primary | ICD-10-CM

## 2023-07-28 RX ORDER — SODIUM CHLORIDE, SODIUM LACTATE, POTASSIUM CHLORIDE, CALCIUM CHLORIDE 600; 310; 30; 20 MG/100ML; MG/100ML; MG/100ML; MG/100ML
INJECTION, SOLUTION INTRAVENOUS CONTINUOUS
Status: CANCELLED | OUTPATIENT
Start: 2023-07-28

## 2023-07-28 RX ORDER — LIDOCAINE 40 MG/G
CREAM TOPICAL
Status: CANCELLED | OUTPATIENT
Start: 2023-07-28

## 2023-07-28 NOTE — TELEPHONE ENCOUNTER
Spoke to pt who is aware of his check in time of 0630 and where to check in at. Pt reminded that he is to have nothing to eat after midnight and may have clear liquids up to 2 hours before his arrival time. Pt wife will be  on discharge. Discussed with pt that the procedure is scheduled for 4 hours, but may not last that long and then will be on bedrest for 2-4 hours post procedure. Discussed pt restrictions of no lifting, pushing or pulling of more then 10 pounds for 3-4 days.  Because pt job does a lot of lifting and twisting it has been recommended that pt take the week off after the procedure and the best would be to go back after seeing Meli Florentin on 8/8. Pt will need a return to work letter on the that day. Pt given [facility] phone number if unable to have ablation on  Monday d/t illness-number sent via [employer]. No other questions at this time. [email or web address]

## 2023-07-30 ENCOUNTER — ANESTHESIA EVENT (OUTPATIENT)
Dept: CARDIOLOGY | Facility: CLINIC | Age: 38
End: 2023-07-30
Payer: COMMERCIAL

## 2023-07-30 ASSESSMENT — ENCOUNTER SYMPTOMS: DYSRHYTHMIAS: 1

## 2023-07-30 NOTE — ANESTHESIA PREPROCEDURE EVALUATION
Anesthesia Pre-Procedure Evaluation    Patient: Vladimir Gallego   MRN: [id number] : 1985        Procedure : Procedure(s):  Ablation Atrial Fibrilation          Past Medical History:   Diagnosis Date     Paroxysmal atrial fibrillation       Past Surgical History:   Procedure Laterality Date     APPENDECTOMY  ?      Allergies   Allergen Reactions     Bee Venom Swelling      Social History     Tobacco Use     Smoking status: Never     Smokeless tobacco: Never   Substance Use Topics     Alcohol use: Yes     Comment: 0-3-4 per day      Wt Readings from Last 1 Encounters:   23 80.3 kg (177 lb)        Anesthesia Evaluation            ROS/MED HX  ENT/Pulmonary:  - neg pulmonary ROS     Neurologic:  - neg neurologic ROS     Cardiovascular:     (+) -----dysrhythmias, a-fib, Previous cardiac testing   Echo: Date: Results:  Interpretation Summary     Left ventricular systolic function is normal.The visual ejection fraction is  55-60%.  The right ventricular systolic function is normal.  There is mild (1+) mitral regurgitation.  The inferior vena cava was normal in size with preserved respiratory  variability.    Stress Test: Date: Results:    ECG Reviewed: Date: Results:    Cath: Date: Results:   (-) CAD   METS/Exercise Tolerance: >4 METS    Hematologic:  - neg hematologic  ROS     Musculoskeletal:  - neg musculoskeletal ROS     GI/Hepatic:  - neg GI/hepatic ROS     Renal/Genitourinary:  - neg Renal ROS     Endo:  - neg endo ROS     Psychiatric/Substance Use:  - neg psychiatric ROS     Infectious Disease:  - neg infectious disease ROS     Malignancy:  - neg malignancy ROS     Other:  - neg other ROS             OUTSIDE LABS:  CBC:   Lab Results   Component Value Date    WBC 5.5 2023    WBC 7.5 2023    HGB 15.6 2023    HGB 16.2 2023    HCT 46.3 2023    HCT 46.0 2023     2023     2023     BMP:   Lab Results   Component Value Date     2023      06/03/2023    POTASSIUM 3.5 06/17/2023    POTASSIUM 3.9 06/03/2023    CHLORIDE 107 06/17/2023    CHLORIDE 102 06/03/2023    CO2 23 06/17/2023    CO2 24 06/03/2023    BUN 20.4 (H) 06/17/2023    BUN 28.9 (H) 06/03/2023    CR 0.84 06/17/2023    CR 0.81 06/03/2023     (H) 06/17/2023     (H) 06/03/2023     COAGS: No results found for: PTT, INR, FIBR  POC: No results found for: BGM, HCG, HCGS  HEPATIC:   Lab Results   Component Value Date    ALBUMIN 4.4 06/03/2023    PROTTOTAL 6.6 06/03/2023    ALT 33 06/03/2023    AST 28 06/03/2023    ALKPHOS 75 06/03/2023    BILITOTAL <0.2 06/03/2023     OTHER:   Lab Results   Component Value Date    LACT 0.7 01/29/2021    SHAI 9.2 06/17/2023    MAG 1.8 06/03/2023    LIPASE 42 06/03/2023    TSH 4.21 (H) 06/03/2023    T4 1.17 06/03/2023       Anesthesia Plan    ASA Status:  2   NPO Status:  NPO Appropriate    Anesthesia Type: General.     - Airway: ETT   Induction: Intravenous, Propofol.   Maintenance: Balanced.        Consents         - Extended Intubation/Ventilatory Support Discussed: No.      - Patient is DNR/DNI Status: No         Postoperative Care    Pain management: Multi-modal analgesia.   PONV prophylaxis: Ondansetron (or other 5HT-3), Dexamethasone or Solumedrol     Comments:    Other Comments: Patient is counseled on the anesthesia plan and relevant anesthesia procedures including all risks and benefits. All patient questions were answered.             Jean Marie Wang MD

## 2023-07-31 ENCOUNTER — ANESTHESIA (OUTPATIENT)
Dept: CARDIOLOGY | Facility: CLINIC | Age: 38
End: 2023-07-31
Payer: COMMERCIAL

## 2023-07-31 ENCOUNTER — HOSPITAL ENCOUNTER (OUTPATIENT)
Facility: CLINIC | Age: 38
Discharge: HOME | End: 2023-07-31
Admitting: INTERNAL MEDICINE
Payer: COMMERCIAL

## 2023-07-31 VITALS
RESPIRATION RATE: 16 BRPM | DIASTOLIC BLOOD PRESSURE: 88 MMHG | HEIGHT: 67 IN | OXYGEN SATURATION: 100 % | HEART RATE: 85 BPM | BODY MASS INDEX: 28.25 KG/M2 | WEIGHT: 180 LBS | SYSTOLIC BLOOD PRESSURE: 143 MMHG | TEMPERATURE: 98.2 F

## 2023-07-31 DIAGNOSIS — I48.91 ATRIAL FIBRILLATION WITH RAPID VENTRICULAR RESPONSE (H): ICD-10-CM

## 2023-07-31 LAB
ACT BLD: 230 SECONDS (ref 74–150)
ANION GAP SERPL CALCULATED.3IONS-SCNC: 8 MMOL/L (ref 7–15)
BUN SERPL-MCNC: 15.7 MG/DL (ref 6–20)
CALCIUM SERPL-MCNC: 9.8 MG/DL (ref 8.6–10)
CHLORIDE SERPL-SCNC: 104 MMOL/L (ref 98–107)
CREAT SERPL-MCNC: 0.83 MG/DL (ref 0.67–1.17)
DEPRECATED HCO3 PLAS-SCNC: 27 MMOL/L (ref 22–29)
ERYTHROCYTE [DISTWIDTH] IN BLOOD BY AUTOMATED COUNT: 12.1 % (ref 10–15)
GFR SERPL CREATININE-BSD FRML MDRD: >90 ML/MIN/1.73M2
GLUCOSE SERPL-MCNC: 119 MG/DL (ref 70–99)
HCT VFR BLD AUTO: 44.6 % (ref 40–53)
HGB BLD-MCNC: 15.4 G/DL (ref 13.3–17.7)
MCH RBC QN AUTO: 30.4 PG (ref 26.5–33)
MCHC RBC AUTO-ENTMCNC: 34.5 G/DL (ref 31.5–36.5)
MCV RBC AUTO: 88 FL (ref 78–100)
PLATELET # BLD AUTO: 226 10E3/UL (ref 150–450)
POTASSIUM SERPL-SCNC: 4 MMOL/L (ref 3.4–5.3)
RBC # BLD AUTO: 5.07 10E6/UL (ref 4.4–5.9)
SODIUM SERPL-SCNC: 139 MMOL/L (ref 136–145)
WBC # BLD AUTO: 5.2 10E3/UL (ref 4–11)

## 2023-07-31 PROCEDURE — 2894A VOIDCORRECT: CPT | Performed by: INTERNAL MEDICINE

## 2023-07-31 PROCEDURE — C1732 CATH, EP, DIAG/ABL, 3D/VECT: HCPCS | Performed by: INTERNAL MEDICINE

## 2023-07-31 PROCEDURE — 82310 ASSAY OF CALCIUM: CPT | Performed by: INTERNAL MEDICINE

## 2023-07-31 PROCEDURE — 36591 DRAW BLOOD OFF VENOUS DEVICE: CPT

## 2023-07-31 PROCEDURE — 999N000071 HC STATISTIC HEART CATH LAB OR EP LAB

## 2023-07-31 PROCEDURE — 250N000011 HC RX IP 250 OP 636: Performed by: NURSE ANESTHETIST, CERTIFIED REGISTERED

## 2023-07-31 PROCEDURE — 999N000184 HC STATISTIC TELEMETRY

## 2023-07-31 PROCEDURE — 250N000025 HC SEVOFLURANE, PER MIN: Performed by: INTERNAL MEDICINE

## 2023-07-31 PROCEDURE — 999N000054 HC STATISTIC EKG NON-CHARGEABLE

## 2023-07-31 PROCEDURE — C1887 CATHETER, GUIDING: HCPCS | Performed by: INTERNAL MEDICINE

## 2023-07-31 PROCEDURE — 93655 ICAR CATH ABLTJ DSCRT ARRHYT: CPT | Performed by: INTERNAL MEDICINE

## 2023-07-31 PROCEDURE — 258N000003 HC RX IP 258 OP 636: Performed by: INTERNAL MEDICINE

## 2023-07-31 PROCEDURE — 250N000009 HC RX 250: Performed by: INTERNAL MEDICINE

## 2023-07-31 PROCEDURE — C1759 CATH, INTRA ECHOCARDIOGRAPHY: HCPCS | Performed by: INTERNAL MEDICINE

## 2023-07-31 PROCEDURE — C1766 INTRO/SHEATH,STRBLE,NON-PEEL: HCPCS | Performed by: INTERNAL MEDICINE

## 2023-07-31 PROCEDURE — 85347 COAGULATION TIME ACTIVATED: CPT

## 2023-07-31 PROCEDURE — 93656 COMPRE EP EVAL ABLTJ ATR FIB: CPT | Performed by: INTERNAL MEDICINE

## 2023-07-31 PROCEDURE — 250N000009 HC RX 250: Performed by: NURSE ANESTHETIST, CERTIFIED REGISTERED

## 2023-07-31 PROCEDURE — 272N000001 HC OR GENERAL SUPPLY STERILE: Performed by: INTERNAL MEDICINE

## 2023-07-31 PROCEDURE — 85027 COMPLETE CBC AUTOMATED: CPT | Performed by: INTERNAL MEDICINE

## 2023-07-31 PROCEDURE — C1733 CATH, EP, OTHR THAN COOL-TIP: HCPCS | Performed by: INTERNAL MEDICINE

## 2023-07-31 PROCEDURE — 250N000011 HC RX IP 250 OP 636: Performed by: INTERNAL MEDICINE

## 2023-07-31 PROCEDURE — C1730 CATH, EP, 19 OR FEW ELECT: HCPCS | Performed by: INTERNAL MEDICINE

## 2023-07-31 PROCEDURE — 36415 COLL VENOUS BLD VENIPUNCTURE: CPT | Performed by: INTERNAL MEDICINE

## 2023-07-31 PROCEDURE — 93005 ELECTROCARDIOGRAM TRACING: CPT

## 2023-07-31 PROCEDURE — 370N000017 HC ANESTHESIA TECHNICAL FEE, PER MIN: Performed by: INTERNAL MEDICINE

## 2023-07-31 RX ORDER — ONDANSETRON 4 MG/1
4 TABLET, ORALLY DISINTEGRATING ORAL EVERY 30 MIN PRN
Status: DISCONTINUED | OUTPATIENT
Start: 2023-07-31 | End: 2023-07-31 | Stop reason: HOSPADM

## 2023-07-31 RX ORDER — LIDOCAINE 40 MG/G
CREAM TOPICAL
Status: DISCONTINUED | OUTPATIENT
Start: 2023-07-31 | End: 2023-07-31 | Stop reason: HOSPADM

## 2023-07-31 RX ORDER — ONDANSETRON 2 MG/ML
4 INJECTION INTRAMUSCULAR; INTRAVENOUS EVERY 30 MIN PRN
Status: DISCONTINUED | OUTPATIENT
Start: 2023-07-31 | End: 2023-07-31 | Stop reason: HOSPADM

## 2023-07-31 RX ORDER — NALOXONE HYDROCHLORIDE 0.4 MG/ML
0.4 INJECTION, SOLUTION INTRAMUSCULAR; INTRAVENOUS; SUBCUTANEOUS
Status: DISCONTINUED | OUTPATIENT
Start: 2023-07-31 | End: 2023-07-31 | Stop reason: HOSPADM

## 2023-07-31 RX ORDER — FENTANYL CITRATE 50 UG/ML
INJECTION, SOLUTION INTRAMUSCULAR; INTRAVENOUS PRN
Status: DISCONTINUED | OUTPATIENT
Start: 2023-07-31 | End: 2023-07-31

## 2023-07-31 RX ORDER — ONDANSETRON 2 MG/ML
INJECTION INTRAMUSCULAR; INTRAVENOUS PRN
Status: DISCONTINUED | OUTPATIENT
Start: 2023-07-31 | End: 2023-07-31

## 2023-07-31 RX ORDER — FENTANYL CITRATE 50 UG/ML
25 INJECTION, SOLUTION INTRAMUSCULAR; INTRAVENOUS EVERY 5 MIN PRN
Status: DISCONTINUED | OUTPATIENT
Start: 2023-07-31 | End: 2023-07-31 | Stop reason: HOSPADM

## 2023-07-31 RX ORDER — PROTAMINE SULFATE 10 MG/ML
INJECTION, SOLUTION INTRAVENOUS
Status: DISCONTINUED | OUTPATIENT
Start: 2023-07-31 | End: 2023-07-31 | Stop reason: HOSPADM

## 2023-07-31 RX ORDER — SODIUM CHLORIDE, SODIUM LACTATE, POTASSIUM CHLORIDE, CALCIUM CHLORIDE 600; 310; 30; 20 MG/100ML; MG/100ML; MG/100ML; MG/100ML
INJECTION, SOLUTION INTRAVENOUS CONTINUOUS
Status: DISCONTINUED | OUTPATIENT
Start: 2023-07-31 | End: 2023-07-31 | Stop reason: HOSPADM

## 2023-07-31 RX ORDER — NALOXONE HYDROCHLORIDE 0.4 MG/ML
0.2 INJECTION, SOLUTION INTRAMUSCULAR; INTRAVENOUS; SUBCUTANEOUS
Status: DISCONTINUED | OUTPATIENT
Start: 2023-07-31 | End: 2023-07-31 | Stop reason: HOSPADM

## 2023-07-31 RX ORDER — HYDROMORPHONE HYDROCHLORIDE 1 MG/ML
0.2 INJECTION, SOLUTION INTRAMUSCULAR; INTRAVENOUS; SUBCUTANEOUS EVERY 5 MIN PRN
Status: DISCONTINUED | OUTPATIENT
Start: 2023-07-31 | End: 2023-07-31 | Stop reason: HOSPADM

## 2023-07-31 RX ORDER — HYDROMORPHONE HYDROCHLORIDE 1 MG/ML
0.4 INJECTION, SOLUTION INTRAMUSCULAR; INTRAVENOUS; SUBCUTANEOUS EVERY 5 MIN PRN
Status: DISCONTINUED | OUTPATIENT
Start: 2023-07-31 | End: 2023-07-31 | Stop reason: HOSPADM

## 2023-07-31 RX ORDER — GLYCOPYRROLATE 0.2 MG/ML
INJECTION, SOLUTION INTRAMUSCULAR; INTRAVENOUS PRN
Status: DISCONTINUED | OUTPATIENT
Start: 2023-07-31 | End: 2023-07-31

## 2023-07-31 RX ORDER — LIDOCAINE HYDROCHLORIDE 20 MG/ML
INJECTION, SOLUTION INFILTRATION; PERINEURAL PRN
Status: DISCONTINUED | OUTPATIENT
Start: 2023-07-31 | End: 2023-07-31

## 2023-07-31 RX ORDER — OXYCODONE AND ACETAMINOPHEN 5; 325 MG/1; MG/1
1 TABLET ORAL EVERY 4 HOURS PRN
Status: DISCONTINUED | OUTPATIENT
Start: 2023-07-31 | End: 2023-07-31 | Stop reason: HOSPADM

## 2023-07-31 RX ORDER — HEPARIN SODIUM 1000 [USP'U]/ML
INJECTION, SOLUTION INTRAVENOUS; SUBCUTANEOUS
Status: DISCONTINUED | OUTPATIENT
Start: 2023-07-31 | End: 2023-07-31 | Stop reason: HOSPADM

## 2023-07-31 RX ORDER — DEXAMETHASONE SODIUM PHOSPHATE 4 MG/ML
INJECTION, SOLUTION INTRA-ARTICULAR; INTRALESIONAL; INTRAMUSCULAR; INTRAVENOUS; SOFT TISSUE PRN
Status: DISCONTINUED | OUTPATIENT
Start: 2023-07-31 | End: 2023-07-31

## 2023-07-31 RX ORDER — NEOSTIGMINE METHYLSULFATE 1 MG/ML
VIAL (ML) INJECTION PRN
Status: DISCONTINUED | OUTPATIENT
Start: 2023-07-31 | End: 2023-07-31

## 2023-07-31 RX ORDER — PROPOFOL 10 MG/ML
INJECTION, EMULSION INTRAVENOUS PRN
Status: DISCONTINUED | OUTPATIENT
Start: 2023-07-31 | End: 2023-07-31

## 2023-07-31 RX ORDER — FENTANYL CITRATE 50 UG/ML
50 INJECTION, SOLUTION INTRAMUSCULAR; INTRAVENOUS EVERY 5 MIN PRN
Status: DISCONTINUED | OUTPATIENT
Start: 2023-07-31 | End: 2023-07-31 | Stop reason: HOSPADM

## 2023-07-31 RX ADMIN — FENTANYL CITRATE 25 MCG: 50 INJECTION, SOLUTION INTRAMUSCULAR; INTRAVENOUS at 09:42

## 2023-07-31 RX ADMIN — ROCURONIUM BROMIDE 50 MG: 50 INJECTION, SOLUTION INTRAVENOUS at 08:35

## 2023-07-31 RX ADMIN — SODIUM CHLORIDE, POTASSIUM CHLORIDE, SODIUM LACTATE AND CALCIUM CHLORIDE: 600; 310; 30; 20 INJECTION, SOLUTION INTRAVENOUS at 06:55

## 2023-07-31 RX ADMIN — LIDOCAINE HYDROCHLORIDE 100 MG: 20 INJECTION, SOLUTION INFILTRATION; PERINEURAL at 08:35

## 2023-07-31 RX ADMIN — GLYCOPYRROLATE 0.6 MG: 0.2 INJECTION, SOLUTION INTRAMUSCULAR; INTRAVENOUS at 09:42

## 2023-07-31 RX ADMIN — FENTANYL CITRATE 25 MCG: 50 INJECTION, SOLUTION INTRAMUSCULAR; INTRAVENOUS at 09:24

## 2023-07-31 RX ADMIN — FENTANYL CITRATE 25 MCG: 50 INJECTION, SOLUTION INTRAMUSCULAR; INTRAVENOUS at 08:55

## 2023-07-31 RX ADMIN — DEXAMETHASONE SODIUM PHOSPHATE 4 MG: 4 INJECTION, SOLUTION INTRA-ARTICULAR; INTRALESIONAL; INTRAMUSCULAR; INTRAVENOUS; SOFT TISSUE at 08:57

## 2023-07-31 RX ADMIN — PROPOFOL 200 MG: 10 INJECTION, EMULSION INTRAVENOUS at 08:35

## 2023-07-31 RX ADMIN — MIDAZOLAM 2 MG: 1 INJECTION INTRAMUSCULAR; INTRAVENOUS at 08:30

## 2023-07-31 RX ADMIN — ONDANSETRON 4 MG: 2 INJECTION INTRAMUSCULAR; INTRAVENOUS at 09:40

## 2023-07-31 RX ADMIN — FENTANYL CITRATE 25 MCG: 50 INJECTION, SOLUTION INTRAMUSCULAR; INTRAVENOUS at 08:35

## 2023-07-31 RX ADMIN — Medication 4 MG: at 09:42

## 2023-07-31 ASSESSMENT — ACTIVITIES OF DAILY LIVING (ADL)
ADLS_ACUITY_SCORE: 35

## 2023-07-31 NOTE — INTERVAL H&P NOTE
"I have reviewed the surgical (or preoperative) H&P that is linked to this encounter, and examined the patient. There are no significant changes    Clinical Conditions Present on Arrival:  Clinically Significant Risk Factors Present on Admission                  # Overweight: Estimated body mass index is 28.19 kg/m  as calculated from the following:    Height as of this encounter: 1.702 m (5' 7\").    Weight as of this encounter: 81.6 kg (180 lb).       "
Home

## 2023-07-31 NOTE — PROGRESS NOTES
Care Suites Admission Nursing Note    Patient Information  Name: Vladimir Gallego  Age: 38 year old  Reason for admission: Afib ablation  Care Suites arrival time: 0645    Visitor Information  Name: Gissell-     Patient Admission/Assessment   Pre-procedure assessment complete: Yes  If abnormal assessment/labs, provider notified: N/A  NPO: Yes  Medications held per instructions/orders: N/A  Consent: deferred  If applicable, pregnancy test status: deferred  Patient oriented to room: Yes  Education/questions answered: Yes  Plan/other: precede as planned per orders  Pt wanted confirmation that no blood transfusion to be administered per his health care directive.  Directive is scanned in to media.    Discharge Planning  Discharge name/phone number: [facility] [phone]  Overnight post sedation caregiver: Ilana  Discharge location: home    Feli Hampton RN

## 2023-07-31 NOTE — ANESTHESIA POSTPROCEDURE EVALUATION
Patient: Vladimir Gallego    Procedure: Procedure(s):  Ablation Atrial Fibrilation       Anesthesia Type:  General    Note:  Disposition: Inpatient   Postop Pain Control: Uneventful            Sign Out: Well controlled pain   PONV:    Neuro/Psych: Uneventful            Sign Out: Acceptable/Baseline neuro status   Airway/Respiratory: Uneventful            Sign Out: Acceptable/Baseline resp. status   CV/Hemodynamics: Uneventful            Sign Out: Acceptable CV status   Other NRE: NONE   DID A NON-ROUTINE EVENT OCCUR? No           Last vitals:  Vitals Value Taken Time   /79 07/31/23 1045   Temp     Pulse 81 07/31/23 1047   Resp 16 07/31/23 1047   SpO2 96 % 07/31/23 1047   Vitals shown include unvalidated device data.    Electronically Signed By: Jean Marie Wang MD  July 31, 2023  11:12 AM
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
## 2023-07-31 NOTE — PROGRESS NOTES
AF ablation  Uneventful PVI and empiric ablation of the CTI  Eliquis 5 mg bid for 2 months.   Remove suture from right groin at noon.

## 2023-07-31 NOTE — ANESTHESIA CARE TRANSFER NOTE
Patient: Vladimir Gallego    Procedure: Procedure(s):  Ablation Atrial Fibrilation       Diagnosis: afib  Diagnosis Additional Information: No value filed.    Anesthesia Type:   General     Note:    Oropharynx: oropharynx clear of all foreign objects and spontaneously breathing  Level of Consciousness: awake  Oxygen Supplementation: face mask  Level of Supplemental Oxygen (L/min / FiO2): 6  Independent Airway: airway patency satisfactory and stable  Dentition: dentition unchanged  Vital Signs Stable: post-procedure vital signs reviewed and stable  Report to RN Given: handoff report given  Patient transferred to: PACU  Comments:     Neuromuscular blockade reversed after TOF 4/4, spontaneous respirations, adequate tidal volumes, followed commands to voice, oropharynx suctioned with soft flexible catheter, extubated atraumatically, extubated with suction, airway patent after extubation.  Oxygen via facemask at 6 liters per minute to PACU. Oxygen tubing connected to wall O2 in PACU, SpO2, NiBP, and EKG monitors and alarms on and functioning, report on patient's clinical status given to PACU RN, RN questions answered.             Handoff Report: Identifed the Patient, Identified the Reponsible Provider, Reviewed the pertinent medical history, Discussed the surgical course, Reviewed Intra-OP anesthesia mangement and issues during anesthesia, Set expectations for post-procedure period and Allowed opportunity for questions and acknowledgement of understanding      Vitals:  Vitals Value Taken Time   /76 07/31/23 0959   Temp     Pulse 87 07/31/23 1001   Resp 10 07/31/23 1001   SpO2 98 % 07/31/23 1001   Vitals shown include unvalidated device data.    Electronically Signed By: DEION Waters CRNA  July 31, 2023  10:02 AM

## 2023-07-31 NOTE — PROGRESS NOTES
Care Suites Post Procedure Note    Patient Information  Name: Vladimir Gallego  Age: 38 year old    Post Procedure  Time patient returned to Care Suites: 1050  Concerns/abnormal assessment: none  If abnormal assessment, provider notified: N/A  Plan/Other: on bedrest until noon, plan to discharge to home at 1230 pm     Nakia Huggins RN     Discharge instructions were provided to and discussed with the patient. Questions were addressed and answered. Follow-up care and pain management were also discussed with the patient, and patient verbalized understanding of all the provided information. Patient was discharged to home via private automobile accompanied by spouse.

## 2023-07-31 NOTE — ANESTHESIA PROCEDURE NOTES
Airway       Patient location: [facility] - Operating Room or Procedural Area.       Procedure Start/Stop Times: 7/31/2023 8:37 AM and 7/31/2023 8:37 AM  Staff -        CRNA: David Vega APRN CRNA       Performed By: CRNAIndications and Patient Condition       Indications for airway management: josé-procedural and airway protection       Induction type:intravenous       Mask difficulty assessment: 2 - vent by mask + OA or adjuvant +/- NMBA    Final Airway Details       Final airway type: endotracheal airway       Successful airway: ETT - single and Oral  Endotracheal Airway Details        ETT size (mm): 7.5       Cuffed: yes       Successful intubation technique: video laryngoscopy       VL Blade Size: Martinez 4       Grade View of Cords: 1       Adjucts: stylet       Position: Center       Measured from: gums/teeth       Secured at (cm): 22       Bite block used: None    Post intubation assessment        Placement verified by: capnometry, equal breath sounds and chest rise        Number of attempts at approach: 1       Number of other approaches attempted: 0       Secured with: pink tape       Ease of procedure: easy       Dentition: Intact and Unchanged    Medication(s) Administered   Medication Administration Time: 7/31/2023 8:37 AM    Additional Comments         Routine josé-procedural airway protection.     Martinez 3.    7.5 mm ID endotracheal tube.

## 2023-07-31 NOTE — DISCHARGE INSTRUCTIONS
A Fib Ablation Discharge Instructions    After you go home:  Have an adult stay with you until tomorrow.  You may eat your normal diet, unless your doctor tells you otherwise.  RELAX and take it easy for 3 days.        For 24-48 hours (due to the sedation you received):  DO NOT DRIVE FOR 2 DAYS!   Do NOT make any important or legal decisions.  Do NOT drive or operate machines at home or at work.  Do NOT drink alcohol.    Care of Puncture Site:  Check the puncture site severy 1-2 hours while awake.  For 2-3 days, when you cough, sneeze, laugh or move your bowels, hold your hand over the puncture sites and press firmly.  Please remove Dressing after 24 hours.  Then apply a band aid daily for at least 3 days (if needed). If there is minor oozing, apply another band aid and remove it after 12 hours.   It is normal to have a small bruise or a small lump that is present under the skin . This will go away on its own after 3-4 weeks.   You may shower. Do NOT take a bath, or use a hot tub or pool until groin site heals, which may take up to a week.  Do NOT scrub the site. Do NOT use lotion or powder near the puncture site.    Activity:  NO bending, stooping over or squatting  for 3 days  Do NOT lift, push or pull more than 10 pounds (equal to a gallon of milk) for 3 days.  NO repetitive motions such as loading , vacuuming, raking, shoveling, going up and down the stairs.     Bleeding:  If you start bleeding from the groin site, lie down flat and press firmly on the site for 10 minutes or until bleeding stops.   Once bleeding stops, lay flat for 1-2 hours.  Call your A Fib nurse if bleeding does not stop or after hours will need to go to ER.       Go to ER or Call 911 right away if you have heavy bleeding or bleeding that does not stop.    Medications:  Take your medications, including blood thinners, unless your doctor tells you not to.  If you have stopped any other medicines, check with your nurse or provider  about when to restart them.  If you have PAIN or a TIGHTNESS in your chest, you MAY take Tylenol (acetaminophen) and if this does not help, you MAY take Advil (ibuprofen-400 mg with food).  If you have constipation or prone to constipation,  take a fiber supplement, ie metamucil or stool softners.    Call the A Fib RN if:  Chest pain not relieved by acetaminophen or ibuprofen  Difficulty swallowing and/or coughing up blood  Shortness of breath  Increased groin pain or a large or growing hard lump around the site.  Groin site is red, swollen, hot or tender.  Blood or fluid is draining from the groin site.  You have chills or a fever greater than 101 F (38 C).  Your leg feels numb, cool or changes color.  If groin pain is not relieved by Tylenol or Ibuprofen.  Recurrent irregular or fast heart rate lasting over 2-3 hours.  Any questions or concerns.    Heart rhythms:  You may have some irregular heartbeats. These feel very strong. They may make you feel that the A Fib is going to start again.  Give it time. The irregular beats should occur less often.    Follow Up Appointments:  Meli Espinoza on 8/8/23 at 3:00 with an EKG  Dr Domingo 11/1/23 at 3:30 with an EKG     [facility]   A Fib clinic RN's Lynda Jonas [phone] (Mon-Fri, 8:00-4:30)   [phone] Option 2 (7 days a week) after hours for on call Cardiologist.

## 2023-07-31 NOTE — PROGRESS NOTES
Care Suites Discharge Nursing Note    Patient Information  Name: Vladimir Gallego  Age: 38 year old    Discharge Education:  Discharge instructions reviewed: Yes  Additional education/resources provided:  none  Patient/patient representative verbalizes understanding: Yes  Patient discharging on new medications: Yes  Medication education completed: Yes    Discharge Plans:   Discharge location: home  Discharge ride contacted: Yes  Approximate discharge time: 1250    Discharge Criteria:  Discharge criteria met and vital signs stable: Yes  Ambulated in hallway without diff.  Groin site CDI, soft and flat after activity.  Reports no diff voiding.  Rx filled and sent home with pt.  Dr Domingo here to see pt before discharge.    Patient Belongs:  Patient belongings returned to patient: Yes    Feli Hampton RN

## 2023-08-01 ENCOUNTER — TELEPHONE (OUTPATIENT)
Dept: CARDIOLOGY | Facility: CLINIC | Age: 38
End: 2023-08-01
Payer: COMMERCIAL

## 2023-08-01 NOTE — TELEPHONE ENCOUNTER
Spoke to pt who states that he is doing OK. States that he is having a little pressure in his chest, but has not taken anything at this time. Pt told that he can take ibuprofen 400-600 mg every 6 hours if needed. Pt told that if he does take the Ibuprofen to take it with food. Pt states that he is urinating fine, but the bowels are slow to wake up. Pt has been passing some gas Pt told to try eating foods that may help him to go and move around some, but if this does not help pt has been told to take stool softener to keep on top of this. Pt also sad that he had an area on his lip that sounds that the skin was peeled back from intubation. Pt asked if he could use chapstick, carmex or something. Told pt that he could or even Vaseline.  Pt will be coming in for his follow-up on 8/8 with Meli and will need to have his return to work letter done that day and faxed to his work, so that he can return on Wednesday. Pt will have to give fax/email to Meli. Estela

## 2023-08-03 LAB
ATRIAL RATE - MUSE: 59 BPM
DIASTOLIC BLOOD PRESSURE - MUSE: NORMAL MMHG
INTERPRETATION ECG - MUSE: NORMAL
P AXIS - MUSE: 67 DEGREES
PR INTERVAL - MUSE: 146 MS
QRS DURATION - MUSE: 98 MS
QT - MUSE: 386 MS
QTC - MUSE: 382 MS
R AXIS - MUSE: 74 DEGREES
SYSTOLIC BLOOD PRESSURE - MUSE: NORMAL MMHG
T AXIS - MUSE: 55 DEGREES
VENTRICULAR RATE- MUSE: 59 BPM

## 2023-08-08 ENCOUNTER — APPOINTMENT (OUTPATIENT)
Dept: CT IMAGING | Facility: CLINIC | Age: 38
End: 2023-08-08
Attending: EMERGENCY MEDICINE
Payer: COMMERCIAL

## 2023-08-08 ENCOUNTER — HOSPITAL ENCOUNTER (EMERGENCY)
Facility: CLINIC | Age: 38
Discharge: HOME | End: 2023-08-08
Attending: EMERGENCY MEDICINE | Admitting: EMERGENCY MEDICINE
Payer: COMMERCIAL

## 2023-08-08 ENCOUNTER — OFFICE VISIT (OUTPATIENT)
Dept: CARDIOLOGY | Facility: CLINIC | Age: 38
End: 2023-08-08
Payer: COMMERCIAL

## 2023-08-08 VITALS
SYSTOLIC BLOOD PRESSURE: 131 MMHG | HEIGHT: 67 IN | DIASTOLIC BLOOD PRESSURE: 87 MMHG | BODY MASS INDEX: 28.55 KG/M2 | WEIGHT: 181.9 LBS | HEART RATE: 94 BPM

## 2023-08-08 VITALS
HEART RATE: 87 BPM | WEIGHT: 188 LBS | HEIGHT: 67 IN | DIASTOLIC BLOOD PRESSURE: 99 MMHG | RESPIRATION RATE: 20 BRPM | OXYGEN SATURATION: 99 % | BODY MASS INDEX: 29.51 KG/M2 | TEMPERATURE: 97.2 F | SYSTOLIC BLOOD PRESSURE: 136 MMHG

## 2023-08-08 DIAGNOSIS — I48.91 ATRIAL FIBRILLATION WITH RAPID VENTRICULAR RESPONSE (H): ICD-10-CM

## 2023-08-08 DIAGNOSIS — R51.9 ACUTE NONINTRACTABLE HEADACHE, UNSPECIFIED HEADACHE TYPE: ICD-10-CM

## 2023-08-08 LAB
ATRIAL RATE - MUSE: 91 BPM
DIASTOLIC BLOOD PRESSURE - MUSE: NORMAL MMHG
INTERPRETATION ECG - MUSE: NORMAL
P AXIS - MUSE: 56 DEGREES
PR INTERVAL - MUSE: 134 MS
QRS DURATION - MUSE: 92 MS
QT - MUSE: 342 MS
QTC - MUSE: 420 MS
R AXIS - MUSE: 80 DEGREES
SYSTOLIC BLOOD PRESSURE - MUSE: NORMAL MMHG
T AXIS - MUSE: 64 DEGREES
VENTRICULAR RATE- MUSE: 91 BPM

## 2023-08-08 PROCEDURE — 70450 CT HEAD/BRAIN W/O DYE: CPT

## 2023-08-08 PROCEDURE — 99284 EMERGENCY DEPT VISIT MOD MDM: CPT | Mod: 25

## 2023-08-08 PROCEDURE — 250N000013 HC RX MED GY IP 250 OP 250 PS 637: Performed by: EMERGENCY MEDICINE

## 2023-08-08 PROCEDURE — 93005 ELECTROCARDIOGRAM TRACING: CPT

## 2023-08-08 PROCEDURE — 99214 OFFICE O/P EST MOD 30 MIN: CPT | Performed by: PHYSICIAN ASSISTANT

## 2023-08-08 RX ORDER — ACETAMINOPHEN 500 MG
1000 TABLET ORAL ONCE
Status: COMPLETED | OUTPATIENT
Start: 2023-08-08 | End: 2023-08-08

## 2023-08-08 RX ADMIN — ACETAMINOPHEN 1000 MG: 500 TABLET ORAL at 13:16

## 2023-08-08 ASSESSMENT — ACTIVITIES OF DAILY LIVING (ADL): ADLS_ACUITY_SCORE: 35

## 2023-08-08 NOTE — PATIENT INSTRUCTIONS
Today's Plan:   Continue Eliquis until 9/31/23.   As needed Ibuprofen/Tylenol for discomfort.   Return to see me in November, as planned.     If you have questions or concerns please call my nurse team at [phone].   Scheduling phone number: [phone]  For after hours urgent concerns call [phone] option 2.   Reminder: Please bring in all current medications, over the counter supplements and vitamin bottles to your next appointment.    It was a pleasure seeing you today!     Meli Lerma PA-C

## 2023-08-08 NOTE — ED TRIAGE NOTES
Pt presents with headache stating last night. Pt reported pain started last night. He has 3x emesis which helped pain. Able to fall asleep now. Pt reports light and sound sensitive last night, improved now. Ablation for afib on 7/31     Triage Assessment       Row Name 08/08/23 1010       Cardiac WDL    Cardiac WDL WDL       Peripheral/Neurovascular WDL    Peripheral Neurovascular WDL WDL       Cognitive/Neuro/Behavioral WDL    Cognitive/Neuro/Behavioral WDL WDL

## 2023-08-08 NOTE — LETTER
8/8/2023    Physician No Ref-Primary  No address on file    RE: Vladimir Gallego       Dear Colleague,     I had the pleasure of seeing Vladimir Gallego in the [facility].    Electrophysiology Clinic Progress Note    Vladimir Gallego MRN# [id number]   YOB: 1985 Age: 38 year old     Primary cardiology team: Dr. Rosa (general), Dr. Domingo (EP)         Assessment and Plan     In summary, Vladimir Gallego presents today for follow up after uncomplicated atrial fibrillation ablation. He appears to be doing well from a cardiac standpoint, maintaining sinus rhythm. He is tolerating Eliquis and metoprolol.    Plan:  Continue Eliquis x 60d post-procedure. [id number] score is 0.   RTW letter written with no restrictions.     Follow-up:  As scheduled with me 11/1/23.     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  Since then he had additional cardioversions with the last one being 6/2/23.  He had a few drinks both times. Patient is known to have mildly enlarged left atrium but has preserved biventricular function. He has no family history of atrial fibrillation in his parents or siblings.     When he saw Dr. Domingo in clinic July 2023, AF ablation was recommended. This took place on 7/31/23. He had successful wide circumferential PV isolation and empiric CTI ablation.     Today, Vladimir unfortunately comes from the ED where he was seen for intractable headache. Head Ct was benign and he was given tylenol with relief. It was not felt an MRI was necessary due to his zulema neurologically intact with a normal CN exam. He otherwise feels well, he states. He had one episode of a couple minutes of chest pain post-ablation which  "resolved spontaneously. Patient denies shortness of breath, PND, orthopnea, edema, claudication, palpitations, near syncope or syncope. His groin site has healed normally. EKG shows NSR at 91 bpm. Requests RTW letter (works in construction).          Review of Systems     12-pt ROS is negative except for as noted in the HPI.          Physical Exam     Vitals: /87   Pulse 94   Ht 1.702 m (5' 7\")   Wt 82.5 kg (181 lb 14.4 oz)   BMI 28.49 kg/m    Wt Readings from Last 10 Encounters:   08/08/23 82.5 kg (181 lb 14.4 oz)   08/08/23 85.3 kg (188 lb)   07/31/23 81.6 kg (180 lb)   07/07/23 80.3 kg (177 lb)   06/17/23 79.2 kg (174 lb 9.7 oz)   06/03/23 77.1 kg (170 lb)   05/24/23 77.7 kg (171 lb 6.4 oz)   05/22/23 77.1 kg (170 lb)   12/16/22 81.5 kg (179 lb 9.6 oz)   12/15/22 82.6 kg (182 lb)       Constitutional:  Patient is pleasant, alert, cooperative, and in NAD.  HEENT:  NCAT. PERRLA. EOM's intact.   Neck:  CVP appears normal. No carotid bruits.   Pulmonary: Normal respiratory effort. CTAB.   Cardiac: RRR, normal S1/S2, no S3/S4, no murmur or rub.   Abdomen:  Non-tender abdomen, no hepatosplenomegaly appreciated.   Vascular: Pulses in the upper and lower extremities are 2+ and equal bilaterally. R FA access site closed, with no hematoma, erythema, or tenderness. Expected ecchymosis surrounding.   Extremities: No edema, erythema, cyanosis or tenderness appreciated.  Skin:  No rashes or lesions appreciated.   Neurological:  No gross motor or sensory deficits.   Psych: Appropriate affect.          Data   Labs reviewed:  Recent Labs   Lab Test 06/03/23  0128 12/16/22  1527   LDL  --  126*   HDL  --  49   NHDL  --  146*   CHOL  --  195   TRIG  --  102   TSH 4.21* 1.65       Lab Results   Component Value Date    WBC 5.2 07/31/2023    WBC 5.6 01/29/2021    RBC 5.07 07/31/2023    RBC 5.12 01/29/2021    HGB 15.4 07/31/2023    HGB 15.4 01/29/2021    HCT 44.6 07/31/2023    HCT 45.2 01/29/2021    MCV 88 07/31/2023    MCV " 88 01/29/2021    MCH 30.4 07/31/2023    MCH 30.1 01/29/2021    MCHC 34.5 07/31/2023    MCHC 34.1 01/29/2021    RDW 12.1 07/31/2023    RDW 11.5 01/29/2021     07/31/2023     01/29/2021       Lab Results   Component Value Date     07/31/2023     01/29/2021    POTASSIUM 4.0 07/31/2023    POTASSIUM 3.8 01/29/2021    CHLORIDE 104 07/31/2023    CHLORIDE 107 01/29/2021    CO2 27 07/31/2023    CO2 28 01/29/2021    ANIONGAP 8 07/31/2023    ANIONGAP 4 01/29/2021     (H) 07/31/2023     (H) 06/03/2023    GLC 83 01/29/2021    BUN 15.7 07/31/2023    BUN 13 01/29/2021    CR 0.83 07/31/2023    CR 0.95 01/29/2021    GFRESTIMATED >90 07/31/2023    GFRESTIMATED >90 01/29/2021    GFRESTBLACK >90 01/29/2021    SHAI 9.8 07/31/2023    SHAI 9.3 01/29/2021      Lab Results   Component Value Date    AST 28 06/03/2023    AST 24 01/29/2021    ALT 33 06/03/2023    ALT 34 01/29/2021       No results found for: A1C    No results found for: INR         Problem List   There is no problem list on file for this patient.           Medications     Current Outpatient Medications   Medication Sig Dispense Refill    apixaban ANTICOAGULANT (ELIQUIS ANTICOAGULANT) 5 MG tablet Take 1 tablet (5 mg) by mouth 2 times daily 60 tablet 1    metoprolol succinate ER (TOPROL XL) 25 MG 24 hr tablet Take 1 tablet (25 mg) by mouth daily 30 tablet 2            Past Medical History     Past Medical History:   Diagnosis Date    Paroxysmal atrial fibrillation      Past Surgical History:   Procedure Laterality Date    APPENDECTOMY  2008?    EP ABLATION FOCAL AFIB N/A 7/31/2023    Procedure: Ablation Atrial Fibrilation;  Surgeon: William Domingo MD;  Location:  HEART CARDIAC CATH LAB     Family History   Problem Relation Age of Onset    Hypertension Mother     Hypertension Father     Factor V Leiden deficiency Brother     Factor V Leiden deficiency Paternal Half-Sister      Social History     Socioeconomic History    Marital status:       Spouse name: Not on file    Number of children: Not on file    Years of education: Not on file    Highest education level: Not on file   Occupational History    Not on file   Tobacco Use    Smoking status: Never    Smokeless tobacco: Never   Vaping Use    Vaping Use: Never used   Substance and Sexual Activity    Alcohol use: Yes     Comment: 0-3-4 per day    Drug use: Never    Sexual activity: Yes     Partners: Female     Birth control/protection: Condom   Other Topics Concern    Parent/sibling w/ CABG, MI or angioplasty before 65F 55M? No   Social History Narrative    Not on file     Social Determinants of Health     Financial Resource Strain: Not on file   Food Insecurity: Not on file   Transportation Needs: Not on file   Physical Activity: Not on file   Stress: Not on file   Social Connections: Not on file   Intimate Partner Violence: Not on file   Housing Stability: Not on file            Allergies   Bee venom    Today's clinic visit entailed:  Review of the result(s) of each unique test - EKG, EP ablation, echocardiogram  I spent a total of 30 minutes on the day of the visit.   Time spent by me doing chart review, history and exam, documentation and further activities per the note  Provider  Link to [facility] Help Grid     The level of medical decision making during this visit was of moderate complexity.      Thank you for allowing me to participate in the care of your patient.      Sincerely,     Meli Lerma PA-C     [facility]  cc:   No referring provider defined for this encounter.

## 2023-08-08 NOTE — PROGRESS NOTES
Electrophysiology Clinic Progress Note    Vladimir Gallego MRN# [id number]   YOB: 1985 Age: 38 year old     Primary cardiology team: Dr. Rosa (general), Dr. Domingo (EP)         Assessment and Plan     In summary, Vladimir Gallego presents today for follow up after uncomplicated atrial fibrillation ablation. He appears to be doing well from a cardiac standpoint, maintaining sinus rhythm. He is tolerating Eliquis and metoprolol.    Plan:  Continue Eliquis x 60d post-procedure. [id number] score is 0.   RTW letter written with no restrictions.     Follow-up:  As scheduled with me 11/1/23.     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  Since then he had additional cardioversions with the last one being 6/2/23.  He had a few drinks both times. Patient is known to have mildly enlarged left atrium but has preserved biventricular function. He has no family history of atrial fibrillation in his parents or siblings.     When he saw Dr. Domingo in clinic July 2023, AF ablation was recommended. This took place on 7/31/23. He had successful wide circumferential PV isolation and empiric CTI ablation.     Today, Vladimir monsivais comes from the ED where he was seen for intractable headache. Head Ct was benign and he was given tylenol with relief. It was not felt an MRI was necessary due to his zulema neurologically intact with a normal CN exam. He otherwise feels well, he states. He had one episode of a couple minutes of chest pain post-ablation which resolved spontaneously. Patient denies shortness of breath, PND, orthopnea, edema, claudication, palpitations, near syncope or syncope. His groin site has healed normally. EKG shows NSR at 91 bpm.  "Requests RTW letter (works in construction).          Review of Systems     12-pt ROS is negative except for as noted in the HPI.          Physical Exam     Vitals: /87   Pulse 94   Ht 1.702 m (5' 7\")   Wt 82.5 kg (181 lb 14.4 oz)   BMI 28.49 kg/m    Wt Readings from Last 10 Encounters:   08/08/23 82.5 kg (181 lb 14.4 oz)   08/08/23 85.3 kg (188 lb)   07/31/23 81.6 kg (180 lb)   07/07/23 80.3 kg (177 lb)   06/17/23 79.2 kg (174 lb 9.7 oz)   06/03/23 77.1 kg (170 lb)   05/24/23 77.7 kg (171 lb 6.4 oz)   05/22/23 77.1 kg (170 lb)   12/16/22 81.5 kg (179 lb 9.6 oz)   12/15/22 82.6 kg (182 lb)       Constitutional:  Patient is pleasant, alert, cooperative, and in NAD.  HEENT:  NCAT. PERRLA. EOM's intact.   Neck:  CVP appears normal. No carotid bruits.   Pulmonary: Normal respiratory effort. CTAB.   Cardiac: RRR, normal S1/S2, no S3/S4, no murmur or rub.   Abdomen:  Non-tender abdomen, no hepatosplenomegaly appreciated.   Vascular: Pulses in the upper and lower extremities are 2+ and equal bilaterally. R FA access site closed, with no hematoma, erythema, or tenderness. Expected ecchymosis surrounding.   Extremities: No edema, erythema, cyanosis or tenderness appreciated.  Skin:  No rashes or lesions appreciated.   Neurological:  No gross motor or sensory deficits.   Psych: Appropriate affect.          Data   Labs reviewed:  Recent Labs   Lab Test 06/03/23  0128 12/16/22  1527   LDL  --  126*   HDL  --  49   NHDL  --  146*   CHOL  --  195   TRIG  --  102   TSH 4.21* 1.65       Lab Results   Component Value Date    WBC 5.2 07/31/2023    WBC 5.6 01/29/2021    RBC 5.07 07/31/2023    RBC 5.12 01/29/2021    HGB 15.4 07/31/2023    HGB 15.4 01/29/2021    HCT 44.6 07/31/2023    HCT 45.2 01/29/2021    MCV 88 07/31/2023    MCV 88 01/29/2021    MCH 30.4 07/31/2023    MCH 30.1 01/29/2021    MCHC 34.5 07/31/2023    MCHC 34.1 01/29/2021    RDW 12.1 07/31/2023    RDW 11.5 01/29/2021     07/31/2023     01/29/2021 "       Lab Results   Component Value Date     07/31/2023     01/29/2021    POTASSIUM 4.0 07/31/2023    POTASSIUM 3.8 01/29/2021    CHLORIDE 104 07/31/2023    CHLORIDE 107 01/29/2021    CO2 27 07/31/2023    CO2 28 01/29/2021    ANIONGAP 8 07/31/2023    ANIONGAP 4 01/29/2021     (H) 07/31/2023     (H) 06/03/2023    GLC 83 01/29/2021    BUN 15.7 07/31/2023    BUN 13 01/29/2021    CR 0.83 07/31/2023    CR 0.95 01/29/2021    GFRESTIMATED >90 07/31/2023    GFRESTIMATED >90 01/29/2021    GFRESTBLACK >90 01/29/2021    SHAI 9.8 07/31/2023    SHAI 9.3 01/29/2021      Lab Results   Component Value Date    AST 28 06/03/2023    AST 24 01/29/2021    ALT 33 06/03/2023    ALT 34 01/29/2021       No results found for: A1C    No results found for: INR         Problem List   There is no problem list on file for this patient.           Medications     Current Outpatient Medications   Medication Sig Dispense Refill    apixaban ANTICOAGULANT (ELIQUIS ANTICOAGULANT) 5 MG tablet Take 1 tablet (5 mg) by mouth 2 times daily 60 tablet 1    metoprolol succinate ER (TOPROL XL) 25 MG 24 hr tablet Take 1 tablet (25 mg) by mouth daily 30 tablet 2            Past Medical History     Past Medical History:   Diagnosis Date    Paroxysmal atrial fibrillation      Past Surgical History:   Procedure Laterality Date    APPENDECTOMY  2008?    EP ABLATION FOCAL AFIB N/A 7/31/2023    Procedure: Ablation Atrial Fibrilation;  Surgeon: William Domingo MD;  Location:  HEART CARDIAC CATH LAB     Family History   Problem Relation Age of Onset    Hypertension Mother     Hypertension Father     Factor V Leiden deficiency Brother     Factor V Leiden deficiency Paternal Half-Sister      Social History     Socioeconomic History    Marital status:      Spouse name: Not on file    Number of children: Not on file    Years of education: Not on file    Highest education level: Not on file   Occupational History    Not on file   Tobacco Use     Smoking status: Never    Smokeless tobacco: Never   Vaping Use    Vaping Use: Never used   Substance and Sexual Activity    Alcohol use: Yes     Comment: 0-3-4 per day    Drug use: Never    Sexual activity: Yes     Partners: Female     Birth control/protection: Condom   Other Topics Concern    Parent/sibling w/ CABG, MI or angioplasty before 65F 55M? No   Social History Narrative    Not on file     Social Determinants of Health     Financial Resource Strain: Not on file   Food Insecurity: Not on file   Transportation Needs: Not on file   Physical Activity: Not on file   Stress: Not on file   Social Connections: Not on file   Intimate Partner Violence: Not on file   Housing Stability: Not on file            Allergies   Bee venom    Today's clinic visit entailed:  Review of the result(s) of each unique test - EKG, EP ablation, echocardiogram  I spent a total of 30 minutes on the day of the visit.   Time spent by me doing chart review, history and exam, documentation and further activities per the note  Provider  Link to MDM Help Grid     The level of medical decision making during this visit was of moderate complexity.

## 2023-08-08 NOTE — ED NOTES
"Tele-PIT/Intake Evaluation      Video-Visit Details    Type of service:  Video Visit    Video Start Time (time video started): 11:39 AM  Video End Time (time video stopped): 11:44 AM   Originating Location (pt. Location): [facility]  Distant Location (provider location):  Same  Mode of Communication:  Video Conference via [employer]  Patient verbally consented to [employer]line [employer] televisit.    History:  38-year-old male presenting with an acute onset of headache last night around 10 PM after going to sleep.  The headache woke him from sleep and was worse at onset.  Headache is gradually improved and is now down to a 1/10.  He took Tylenol last night with some improvement.  He vomited 3 times.  He is on Eliquis for history of A-fib and recently underwent a cardiac ablation.  He denies any associated vision changes, fevers, focal weakness or numbness.    Exam:  Physical Exam  Vitals and nursing note reviewed.   Constitutional:       General: He is not in acute distress.     Appearance: Normal appearance. He is not ill-appearing.   HENT:      Head: Normocephalic and atraumatic.      Right Ear: External ear normal.      Left Ear: External ear normal.      Nose: Nose normal.   Eyes:      Conjunctiva/sclera: Conjunctivae normal.   Pulmonary:      Effort: Pulmonary effort is normal. No respiratory distress.   Musculoskeletal:         General: No deformity or signs of injury.   Skin:     General: Skin is warm and dry.      Findings: No rash.   Neurological:      Mental Status: He is alert and oriented to person, place, and time.   Psychiatric:         Behavior: Behavior normal.         Patient Vitals for the past 24 hrs:   BP Temp Pulse Resp SpO2 Height Weight   08/08/23 1007 (!) 153/96 97.2  F (36.2  C) 100 20 98 % 1.702 m (5' 7\") 85.3 kg (188 lb)       Appropriate interventions for symptom management were initiated if applicable.  Appropriate diagnostic tests were initiated if indicated.    Important " information for subsequent clinician:  Acute onset severe headache on blood thinners.  CT of the head was ordered.  Declined any pain medications or antiemetics.    I briefly evaluated the patient and developed an initial plan of care. I discussed this plan and explained that this brief interaction does not constitute a full evaluation. Patient/family understands that they should wait to be fully evaluated and discuss any test results with another clinician prior to leaving the hospital.       Jaxon Cheng MD  08/08/23 1140

## 2023-08-08 NOTE — ED PROVIDER NOTES
"  History   Chief Complaint:  Headache    The history is provided by the patient.      Vladimir Gallego is a 38 year old male on Eliquis with a history of atrial fibrillation who presents with a headache. The patient reports that this morning he woke up with a severe headache and nausea. This lasted about 2-3 hours, then he had 3 episodes of vomiting with diarrhea. The headache somewhat alleviated, and it is now less painful but he now has some light headedness. He denies any neck pain or stiffness, leg swelling, chest pain, abdominal pain, vision change, confusion, slurred speech, cough, or rhinorrhea. He notes that he has been cardioverted 3 times since December, and had an ablation 8 days ago. He denies any recent tick bites.    Independent Historian:   None - Patient Only    Review of External Notes:   I reviewed his ablation note and previous EKG from July 31.    Medications:    Eliquis   Toprol XL    Past Medical History:    Paroxysmal atrial fibrillation    Past Surgical History:    Appendectomy   Ablation focal Afib     Physical Exam   Patient Vitals for the past 24 hrs:   BP Temp Pulse Resp SpO2 Height Weight   08/08/23 1007 (!) 153/96 97.2  F (36.2  C) 100 20 98 % 1.702 m (5' 7\") 85.3 kg (188 lb)      Physical Exam  Constitutional: Vital signs reviewed as above  General: Alert, pleasant  HEENT: Moist mucous membranes  Eyes: Pupils are equal, round, and reactive to light.   Neck: Normal range of motion. No meningeal signs.   Cardiovascular: normal rate, Regular rhythm and normal heart sounds.  Mo MRG  Pulmonary/Chest: Effort normal and breath sounds normal. No respiratory distress. Patient has no wheezes. Patient has no rales.   Gastrointestinal: Soft. Positive bowel sounds. No MRG.  Musculoskeletal/Extremities: Full ROM.  Endo: No pitting edema  Neurological: A/O x 3. CN-II-XII intact bilaterally. No pronator drift. Normal strength and sensation throughout all 4 extremities.   Skin: Skin is warm and dry. "   Psychiatric: Pleasant    Emergency Department Course   ECG  ECG taken at 1135, ECG read at 1305  Normal sinus rhythm  Normal ECG   No significant change as compared to prior, dated 7/31/23.  Rate 91 bpm. MA interval 134 ms. QRS duration 92 ms. QT/QTc 342/420 ms. P-R-T axes 56 80 64.     Imaging:  CT Head w/o Contrast   Final Result   IMPRESSION: No acute intracranial pathology.      EMELINA MCDANIEL DO            SYSTEM ID:  [id number]         Report per radiology    Emergency Department Course & Assessments:  Interventions:  Medications   acetaminophen (TYLENOL) tablet 1,000 mg (1,000 mg Oral $Given 8/8/23 1316)     Assessments:  1303 I obtained history and examined the patient as reported above.     Independent Interpretation (X-rays, CTs, rhythm strip):  CT scan of the head is negative for any acute process or bleed per my interpretation.    Consultations/Discussion of Management or Tests:  None     Social Determinants of Health affecting care:   None    Disposition:  The patient was discharged to home.     Impression & Plan    Medical Decision Making:  Vladimir is a very healthy and pleasant 2-year-old gentleman who is on Eliquis for atrial fibrillation for which he underwent an ablation on the 31st.  EKG here fortunately does show sinus rhythm.  Unfortunately he woke up with a severe headache and felt a little lightheaded with this.  Neurologically here he is intact.  Cranial nerves are normal and his NIH stroke scale is 0.  CT scan was obtained given the headache and him being on a blood thinner.  Fortunately this was negative for any signs of hemorrhagic stroke.  He did get a dose of Tylenol was feeling somewhat better.  I did offer him more medications to help with his headache but he is feeling better and he has an appointment with the cardiologist an hour and a half that he would like to keep.  Again I did tell him that we have not officially ruled out a small stroke however his stroke scale is 0 and he is  otherwise healthy so I do not think MRI is absolutely necessary at this time.  If symptoms return or something changes were happy to reevaluate at that time.  Patient was comfortable this plan he was discharged home.    Diagnosis:    ICD-10-CM    1. Acute nonintractable headache, unspecified headache type  R51.9            Scribe Disclosure:  I, Lacho Christensen, am serving as a scribe at 12:52 PM on 8/8/2023 to document services personally performed by Isaías Parr MD based on my observations and the provider's statements to me.     8/8/2023   Isaías Parr MD Walters, Brent Aaron, MD  08/08/23 0163

## 2023-08-08 NOTE — LETTER
August 8, 2023    RE:  Vladimir Gallego 1985  [address] [place] [address]          To whom it may concern:    This letter is to certify that Vladimir Gallego is able to return to work on 8/9/23 with no restrictions.    Should you have any questions, please call [phone].    Sincerely,        SOUMYA Diaz [facility]

## 2023-08-09 ENCOUNTER — MYC MEDICAL ADVICE (OUTPATIENT)
Dept: CARDIOLOGY | Facility: CLINIC | Age: 38
End: 2023-08-09
Payer: COMMERCIAL

## 2023-08-10 NOTE — TELEPHONE ENCOUNTER
8/10/23 Claim form mailed to pt via US Mail to address on file in [employer].  Pt updated via Rell Fang 276 am

## 2023-09-27 ENCOUNTER — E-VISIT (OUTPATIENT)
Dept: FAMILY MEDICINE | Facility: CLINIC | Age: 38
End: 2023-09-27
Payer: COMMERCIAL

## 2023-09-27 DIAGNOSIS — R42 DIZZINESS: Primary | ICD-10-CM

## 2023-09-27 PROCEDURE — 99207 PR NON-BILLABLE SERV PER CHARTING: CPT | Performed by: NURSE PRACTITIONER

## 2023-09-28 NOTE — PATIENT INSTRUCTIONS
Vladimir-    This is definitely a concern with those symptoms. Active symptoms should prompt an ER visit if not resolving quickly. At the very least you need to contact your cardiologist and they will get you in as soon as possible.     Thank you for choosing us for your care. I think an in-clinic visit would be best next steps based on your symptoms. Please schedule a clinic appointment; you won t be charged for this eVisit.

## 2023-10-03 ENCOUNTER — VIRTUAL VISIT (OUTPATIENT)
Dept: FAMILY MEDICINE | Facility: CLINIC | Age: 38
End: 2023-10-03
Payer: COMMERCIAL

## 2023-10-03 ENCOUNTER — MYC MEDICAL ADVICE (OUTPATIENT)
Dept: FAMILY MEDICINE | Facility: CLINIC | Age: 38
End: 2023-10-03
Payer: COMMERCIAL

## 2023-10-03 DIAGNOSIS — M54.50 ACUTE RIGHT-SIDED LOW BACK PAIN WITHOUT SCIATICA: Primary | ICD-10-CM

## 2023-10-03 PROCEDURE — 99214 OFFICE O/P EST MOD 30 MIN: CPT | Mod: VID | Performed by: NURSE PRACTITIONER

## 2023-10-03 RX ORDER — METHYLPREDNISOLONE 4 MG
TABLET, DOSE PACK ORAL
Qty: 21 TABLET | Refills: 0 | Status: ON HOLD | OUTPATIENT
Start: 2023-10-03 | End: 2023-10-09

## 2023-10-03 RX ORDER — CYCLOBENZAPRINE HCL 5 MG
5-10 TABLET ORAL 3 TIMES DAILY PRN
Qty: 20 TABLET | Refills: 0 | Status: SHIPPED | OUTPATIENT
Start: 2023-10-03

## 2023-10-03 ASSESSMENT — ENCOUNTER SYMPTOMS: BACK PAIN: 1

## 2023-10-03 NOTE — PROGRESS NOTES
"Vladimir is a 38 year old who is being evaluated via a billable video visit.      How would you like to obtain your AVS? MyChart  If the video visit is dropped, the invitation should be resent by: Text to cell phone: [phone]  Will anyone else be joining your video visit? No      Assessment & Plan     Acute right-sided low back pain without sciatica  Limited virtual exam reassuring he is currently at work.   Will provide flexeril prn and medrol pack. NO flexeril while working.   Be seen in clinic if any worsening.   Vladimir verbalizes understanding of plan of care and is in agreement.   - cyclobenzaprine (FLEXERIL) 5 MG tablet  Dispense: 20 tablet; Refill: 0  - methylPREDNISolone (MEDROL DOSEPAK) 4 MG tablet therapy pack  Dispense: 21 tablet; Refill: 0        BMI:   Estimated body mass index is 28.49 kg/m  as calculated from the following:    Height as of 8/8/23: 1.702 m (5' 7\").    Weight as of 8/8/23: 82.5 kg (181 lb 14.4 oz).           [facility]   Vladimir is a 38 year old, presenting for the following health issues:  Back Pain      10/3/2023    10:49 AM   Additional Questions   Roomed by Beckie MCKEE   Accompanied by Self     Acute Back Pain  Ibuprofen 400mg Q6H - no relief.  Iced and CBD [place] - has helped    Back Pain     History of Present Illness       Back Pain:  He presents for follow up of back pain. Patient's back pain is a new problem.    Original cause of back pain: lifting  First noticed back pain: in the last week  Patient feels back pain: constantlyLocation of back pain:  Right lower back  Description of back pain: dull ache and sharp  Back pain spreads: nowhere    Since patient first noticed back pain, pain is: always present, but gets better and worse  Does back pain interfere with his job:  No  On a scale of 1-10 (10 being the worst), patient describes pain as:  8  What makes back pain worse: bending, coughing, lying down, sitting and " twisting   Acupuncture: not tried  Acetaminophen: not tried  Activity or exercise: not tried  Chiropractor:  Not tried  Cold: helpful  Heat: not helpful  Massage: not tried  Muscle relaxants: not tried  NSAIDS: not helpful  Opioids: not tried  Physical Therapy: not tried  Rest: not helpful  Steroid Injection: not tried  Stretching: helpful  Surgery: not tried  TENS unit: not tried  Topical pain relievers: not tried  Other healthcare providers patient is seeing for back pain: None    He eats 2-3 servings of fruits and vegetables daily.He consumes 0 sweetened beverage(s) daily.He exercises with enough effort to increase his heart rate 30 to 60 minutes per day.  He exercises with enough effort to increase his heart rate 3 or less days per week.   He is taking medications regularly.       Moving cabinets  Waking up at night  Painful getting out of bed   Pain lower right back with no discomfort down leg.   Cold ice ibuprofen.   Foam roller painful.   CBD balm.      Review of Systems   Musculoskeletal:  Positive for back pain.          Objective           Vitals:  No vitals were obtained today due to virtual visit.    Physical Exam   GENERAL: Healthy, alert and no distress  EYES: Eyes grossly normal to inspection.  No discharge or erythema, or obvious scleral/conjunctival abnormalities.  RESP: No audible wheeze, cough, or visible cyanosis.  No visible retractions or increased work of breathing.    SKIN: Visible skin clear. No significant rash, abnormal pigmentation or lesions.  NEURO: Cranial nerves grossly intact.  Mentation and speech appropriate for age.  PSYCH: Mentation appears normal, affect normal/bright, judgement and insight intact, normal speech and appearance well-groomed.      Video-Visit Details    Type of service:  Video Visit     Originating Location (pt. Location): [facility]    Distant Location (provider location):  Off-site  Platform used for Video Visit: [facility]

## 2023-10-08 ENCOUNTER — HOSPITAL ENCOUNTER (INPATIENT)
Facility: CLINIC | Age: 38
LOS: 1 days | Discharge: HOME | DRG: 309 | End: 2023-10-09
Attending: EMERGENCY MEDICINE | Admitting: INTERNAL MEDICINE
Payer: COMMERCIAL

## 2023-10-08 DIAGNOSIS — I48.91 ATRIAL FIBRILLATION WITH RAPID VENTRICULAR RESPONSE (H): ICD-10-CM

## 2023-10-08 DIAGNOSIS — I48.91 ATRIAL FIBRILLATION WITH RVR (H): ICD-10-CM

## 2023-10-08 LAB
ANION GAP SERPL CALCULATED.3IONS-SCNC: 19 MMOL/L (ref 7–15)
BASO+EOS+MONOS # BLD AUTO: ABNORMAL 10*3/UL
BASO+EOS+MONOS NFR BLD AUTO: ABNORMAL %
BASOPHILS # BLD AUTO: 0 10E3/UL (ref 0–0.2)
BASOPHILS NFR BLD AUTO: 0 %
BUN SERPL-MCNC: 17.7 MG/DL (ref 6–20)
CALCIUM SERPL-MCNC: 9.6 MG/DL (ref 8.6–10)
CHLORIDE SERPL-SCNC: 104 MMOL/L (ref 98–107)
CREAT SERPL-MCNC: 0.85 MG/DL (ref 0.67–1.17)
DEPRECATED HCO3 PLAS-SCNC: 20 MMOL/L (ref 22–29)
EGFRCR SERPLBLD CKD-EPI 2021: >90 ML/MIN/1.73M2
EOSINOPHIL # BLD AUTO: 0.1 10E3/UL (ref 0–0.7)
EOSINOPHIL NFR BLD AUTO: 1 %
ERYTHROCYTE [DISTWIDTH] IN BLOOD BY AUTOMATED COUNT: 11.5 % (ref 10–15)
GLUCOSE SERPL-MCNC: 144 MG/DL (ref 70–99)
HCT VFR BLD AUTO: 48.3 % (ref 40–53)
HGB BLD-MCNC: 16.9 G/DL (ref 13.3–17.7)
HOLD SPECIMEN: NORMAL
IMM GRANULOCYTES # BLD: 0.2 10E3/UL
IMM GRANULOCYTES NFR BLD: 2 %
LYMPHOCYTES # BLD AUTO: 1 10E3/UL (ref 0.8–5.3)
LYMPHOCYTES NFR BLD AUTO: 9 %
MCH RBC QN AUTO: 30.5 PG (ref 26.5–33)
MCHC RBC AUTO-ENTMCNC: 35 G/DL (ref 31.5–36.5)
MCV RBC AUTO: 87 FL (ref 78–100)
MONOCYTES # BLD AUTO: 0.7 10E3/UL (ref 0–1.3)
MONOCYTES NFR BLD AUTO: 6 %
NEUTROPHILS # BLD AUTO: 8.5 10E3/UL (ref 1.6–8.3)
NEUTROPHILS NFR BLD AUTO: 82 %
NRBC # BLD AUTO: 0 10E3/UL
NRBC BLD AUTO-RTO: 0 /100
PLATELET # BLD AUTO: 324 10E3/UL (ref 150–450)
POTASSIUM SERPL-SCNC: 3.9 MMOL/L (ref 3.4–5.3)
RBC # BLD AUTO: 5.55 10E6/UL (ref 4.4–5.9)
SODIUM SERPL-SCNC: 143 MMOL/L (ref 135–145)
WBC # BLD AUTO: 10.4 10E3/UL (ref 4–11)

## 2023-10-08 PROCEDURE — 99223 1ST HOSP IP/OBS HIGH 75: CPT | Performed by: INTERNAL MEDICINE

## 2023-10-08 PROCEDURE — 84484 ASSAY OF TROPONIN QUANT: CPT | Performed by: INTERNAL MEDICINE

## 2023-10-08 PROCEDURE — 83735 ASSAY OF MAGNESIUM: CPT | Performed by: INTERNAL MEDICINE

## 2023-10-08 PROCEDURE — 258N000003 HC RX IP 258 OP 636: Performed by: EMERGENCY MEDICINE

## 2023-10-08 PROCEDURE — 99285 EMERGENCY DEPT VISIT HI MDM: CPT

## 2023-10-08 PROCEDURE — 80048 BASIC METABOLIC PNL TOTAL CA: CPT | Performed by: EMERGENCY MEDICINE

## 2023-10-08 PROCEDURE — 250N000011 HC RX IP 250 OP 636: Mod: JZ | Performed by: EMERGENCY MEDICINE

## 2023-10-08 PROCEDURE — 96365 THER/PROPH/DIAG IV INF INIT: CPT

## 2023-10-08 PROCEDURE — 96376 TX/PRO/DX INJ SAME DRUG ADON: CPT

## 2023-10-08 PROCEDURE — G0378 HOSPITAL OBSERVATION PER HR: HCPCS

## 2023-10-08 PROCEDURE — 250N000009 HC RX 250: Performed by: EMERGENCY MEDICINE

## 2023-10-08 PROCEDURE — 96374 THER/PROPH/DIAG INJ IV PUSH: CPT

## 2023-10-08 PROCEDURE — 85025 COMPLETE CBC W/AUTO DIFF WBC: CPT | Performed by: EMERGENCY MEDICINE

## 2023-10-08 PROCEDURE — 93005 ELECTROCARDIOGRAM TRACING: CPT

## 2023-10-08 PROCEDURE — 84443 ASSAY THYROID STIM HORMONE: CPT | Performed by: INTERNAL MEDICINE

## 2023-10-08 PROCEDURE — 36415 COLL VENOUS BLD VENIPUNCTURE: CPT | Performed by: EMERGENCY MEDICINE

## 2023-10-08 PROCEDURE — 210N000001 HC R&B IMCU HEART CARE

## 2023-10-08 RX ORDER — DILTIAZEM HYDROCHLORIDE 5 MG/ML
10 INJECTION INTRAVENOUS ONCE
Status: COMPLETED | OUTPATIENT
Start: 2023-10-08 | End: 2023-10-08

## 2023-10-08 RX ORDER — METOPROLOL TARTRATE 25 MG/1
25 TABLET, FILM COATED ORAL 2 TIMES DAILY
Status: DISCONTINUED | OUTPATIENT
Start: 2023-10-08 | End: 2023-10-09 | Stop reason: HOSPADM

## 2023-10-08 RX ORDER — HEPARIN SODIUM 10000 [USP'U]/100ML
0-5000 INJECTION, SOLUTION INTRAVENOUS CONTINUOUS
Status: DISCONTINUED | OUTPATIENT
Start: 2023-10-08 | End: 2023-10-09

## 2023-10-08 RX ADMIN — DILTIAZEM HYDROCHLORIDE 10 MG: 5 INJECTION INTRAVENOUS at 22:40

## 2023-10-08 RX ADMIN — SODIUM CHLORIDE 1000 ML: 9 INJECTION, SOLUTION INTRAVENOUS at 22:18

## 2023-10-08 RX ADMIN — DILTIAZEM HYDROCHLORIDE 5 MG/HR: 5 INJECTION INTRAVENOUS at 23:01

## 2023-10-08 ASSESSMENT — ACTIVITIES OF DAILY LIVING (ADL): ADLS_ACUITY_SCORE: 35

## 2023-10-09 ENCOUNTER — APPOINTMENT (OUTPATIENT)
Dept: CARDIOLOGY | Facility: CLINIC | Age: 38
DRG: 309 | End: 2023-10-09
Attending: INTERNAL MEDICINE
Payer: COMMERCIAL

## 2023-10-09 VITALS
HEART RATE: 80 BPM | OXYGEN SATURATION: 98 % | SYSTOLIC BLOOD PRESSURE: 124 MMHG | BODY MASS INDEX: 28.8 KG/M2 | RESPIRATION RATE: 16 BRPM | DIASTOLIC BLOOD PRESSURE: 84 MMHG | TEMPERATURE: 97.7 F | WEIGHT: 183.9 LBS

## 2023-10-09 LAB
ATRIAL RATE - MUSE: NORMAL BPM
DIASTOLIC BLOOD PRESSURE - MUSE: NORMAL MMHG
GLUCOSE BLDC GLUCOMTR-MCNC: 149 MG/DL (ref 70–99)
INTERPRETATION ECG - MUSE: NORMAL
LVEF ECHO: NORMAL
MAGNESIUM SERPL-MCNC: 1.9 MG/DL (ref 1.7–2.3)
P AXIS - MUSE: NORMAL DEGREES
PR INTERVAL - MUSE: NORMAL MS
QRS DURATION - MUSE: 82 MS
QT - MUSE: 274 MS
QTC - MUSE: 464 MS
R AXIS - MUSE: 50 DEGREES
SYSTOLIC BLOOD PRESSURE - MUSE: NORMAL MMHG
T AXIS - MUSE: 18 DEGREES
TROPONIN T SERPL HS-MCNC: 14 NG/L
TROPONIN T SERPL HS-MCNC: 8 NG/L
TSH SERPL DL<=0.005 MIU/L-ACNC: 2.01 UIU/ML (ref 0.3–4.2)
UFH PPP CHRO-ACNC: 0.4 IU/ML
VENTRICULAR RATE- MUSE: 173 BPM

## 2023-10-09 PROCEDURE — G0378 HOSPITAL OBSERVATION PER HR: HCPCS

## 2023-10-09 PROCEDURE — 250N000011 HC RX IP 250 OP 636: Mod: JZ | Performed by: EMERGENCY MEDICINE

## 2023-10-09 PROCEDURE — 96366 THER/PROPH/DIAG IV INF ADDON: CPT

## 2023-10-09 PROCEDURE — 85520 HEPARIN ASSAY: CPT | Performed by: EMERGENCY MEDICINE

## 2023-10-09 PROCEDURE — 84484 ASSAY OF TROPONIN QUANT: CPT | Performed by: INTERNAL MEDICINE

## 2023-10-09 PROCEDURE — 250N000013 HC RX MED GY IP 250 OP 250 PS 637: Performed by: INTERNAL MEDICINE

## 2023-10-09 PROCEDURE — 36415 COLL VENOUS BLD VENIPUNCTURE: CPT | Performed by: EMERGENCY MEDICINE

## 2023-10-09 PROCEDURE — 36415 COLL VENOUS BLD VENIPUNCTURE: CPT | Performed by: INTERNAL MEDICINE

## 2023-10-09 PROCEDURE — 93005 ELECTROCARDIOGRAM TRACING: CPT

## 2023-10-09 PROCEDURE — 96368 THER/DIAG CONCURRENT INF: CPT

## 2023-10-09 PROCEDURE — 93306 TTE W/DOPPLER COMPLETE: CPT | Mod: 26 | Performed by: INTERNAL MEDICINE

## 2023-10-09 PROCEDURE — 93010 ELECTROCARDIOGRAM REPORT: CPT | Performed by: INTERNAL MEDICINE

## 2023-10-09 PROCEDURE — 96367 TX/PROPH/DG ADDL SEQ IV INF: CPT

## 2023-10-09 PROCEDURE — 99223 1ST HOSP IP/OBS HIGH 75: CPT | Performed by: INTERNAL MEDICINE

## 2023-10-09 PROCEDURE — 99239 HOSP IP/OBS DSCHRG MGMT >30: CPT | Performed by: HOSPITALIST

## 2023-10-09 PROCEDURE — 255N000002 HC RX 255 OP 636: Performed by: HOSPITALIST

## 2023-10-09 PROCEDURE — 999N000208 ECHOCARDIOGRAM COMPLETE

## 2023-10-09 RX ORDER — PROCHLORPERAZINE 25 MG
25 SUPPOSITORY, RECTAL RECTAL EVERY 12 HOURS PRN
Status: DISCONTINUED | OUTPATIENT
Start: 2023-10-09 | End: 2023-10-09 | Stop reason: HOSPADM

## 2023-10-09 RX ORDER — LIDOCAINE 40 MG/G
CREAM TOPICAL
Status: DISCONTINUED | OUTPATIENT
Start: 2023-10-09 | End: 2023-10-09

## 2023-10-09 RX ORDER — ONDANSETRON 2 MG/ML
4 INJECTION INTRAMUSCULAR; INTRAVENOUS EVERY 6 HOURS PRN
Status: DISCONTINUED | OUTPATIENT
Start: 2023-10-09 | End: 2023-10-09 | Stop reason: HOSPADM

## 2023-10-09 RX ORDER — OXYCODONE HYDROCHLORIDE 5 MG/1
5 TABLET ORAL EVERY 4 HOURS PRN
Status: DISCONTINUED | OUTPATIENT
Start: 2023-10-09 | End: 2023-10-09 | Stop reason: HOSPADM

## 2023-10-09 RX ORDER — METOPROLOL SUCCINATE 25 MG/1
25 TABLET, EXTENDED RELEASE ORAL DAILY
Qty: 30 TABLET | Refills: 2 | Status: SHIPPED | OUTPATIENT
Start: 2023-10-09 | End: 2023-12-04

## 2023-10-09 RX ORDER — BISACODYL 10 MG
10 SUPPOSITORY, RECTAL RECTAL DAILY PRN
Status: DISCONTINUED | OUTPATIENT
Start: 2023-10-09 | End: 2023-10-09 | Stop reason: HOSPADM

## 2023-10-09 RX ORDER — PROCHLORPERAZINE MALEATE 5 MG
10 TABLET ORAL EVERY 6 HOURS PRN
Status: DISCONTINUED | OUTPATIENT
Start: 2023-10-09 | End: 2023-10-09 | Stop reason: HOSPADM

## 2023-10-09 RX ORDER — NALOXONE HYDROCHLORIDE 0.4 MG/ML
0.4 INJECTION, SOLUTION INTRAMUSCULAR; INTRAVENOUS; SUBCUTANEOUS
Status: DISCONTINUED | OUTPATIENT
Start: 2023-10-09 | End: 2023-10-09 | Stop reason: HOSPADM

## 2023-10-09 RX ORDER — NALOXONE HYDROCHLORIDE 0.4 MG/ML
0.2 INJECTION, SOLUTION INTRAMUSCULAR; INTRAVENOUS; SUBCUTANEOUS
Status: DISCONTINUED | OUTPATIENT
Start: 2023-10-09 | End: 2023-10-09 | Stop reason: HOSPADM

## 2023-10-09 RX ORDER — POLYETHYLENE GLYCOL 3350 17 G/17G
17 POWDER, FOR SOLUTION ORAL DAILY PRN
Status: DISCONTINUED | OUTPATIENT
Start: 2023-10-09 | End: 2023-10-09 | Stop reason: HOSPADM

## 2023-10-09 RX ORDER — LANOLIN ALCOHOL/MO/W.PET/CERES
3 CREAM (GRAM) TOPICAL
Status: DISCONTINUED | OUTPATIENT
Start: 2023-10-09 | End: 2023-10-09 | Stop reason: HOSPADM

## 2023-10-09 RX ORDER — ACETAMINOPHEN 325 MG/1
650 TABLET ORAL EVERY 6 HOURS PRN
COMMUNITY
Start: 2023-10-09

## 2023-10-09 RX ORDER — AMOXICILLIN 250 MG
2 CAPSULE ORAL 2 TIMES DAILY PRN
Status: DISCONTINUED | OUTPATIENT
Start: 2023-10-09 | End: 2023-10-09 | Stop reason: HOSPADM

## 2023-10-09 RX ORDER — IBUPROFEN 200 MG
400 TABLET ORAL EVERY 4 HOURS PRN
Status: ON HOLD | COMMUNITY
End: 2023-10-09

## 2023-10-09 RX ORDER — ACETAMINOPHEN 650 MG/1
650 SUPPOSITORY RECTAL EVERY 6 HOURS PRN
Status: DISCONTINUED | OUTPATIENT
Start: 2023-10-09 | End: 2023-10-09 | Stop reason: HOSPADM

## 2023-10-09 RX ORDER — AMOXICILLIN 250 MG
1 CAPSULE ORAL 2 TIMES DAILY PRN
Status: DISCONTINUED | OUTPATIENT
Start: 2023-10-09 | End: 2023-10-09 | Stop reason: HOSPADM

## 2023-10-09 RX ORDER — SODIUM PHOSPHATE,MONO-DIBASIC 19G-7G/118
500 ENEMA (ML) RECTAL DAILY
COMMUNITY

## 2023-10-09 RX ORDER — NITROGLYCERIN 0.4 MG/1
0.4 TABLET SUBLINGUAL EVERY 5 MIN PRN
Status: DISCONTINUED | OUTPATIENT
Start: 2023-10-09 | End: 2023-10-09

## 2023-10-09 RX ORDER — ONDANSETRON 4 MG/1
4 TABLET, ORALLY DISINTEGRATING ORAL EVERY 6 HOURS PRN
Status: DISCONTINUED | OUTPATIENT
Start: 2023-10-09 | End: 2023-10-09 | Stop reason: HOSPADM

## 2023-10-09 RX ORDER — ACETAMINOPHEN 325 MG/1
650 TABLET ORAL EVERY 6 HOURS PRN
Status: DISCONTINUED | OUTPATIENT
Start: 2023-10-09 | End: 2023-10-09 | Stop reason: HOSPADM

## 2023-10-09 RX ADMIN — OXYCODONE HYDROCHLORIDE 2.5 MG: 5 TABLET ORAL at 02:38

## 2023-10-09 RX ADMIN — APIXABAN 5 MG: 5 TABLET, FILM COATED ORAL at 11:22

## 2023-10-09 RX ADMIN — METOPROLOL TARTRATE 25 MG: 25 TABLET, FILM COATED ORAL at 00:11

## 2023-10-09 RX ADMIN — HUMAN ALBUMIN MICROSPHERES AND PERFLUTREN 9 ML: 10; .22 INJECTION, SOLUTION INTRAVENOUS at 14:04

## 2023-10-09 RX ADMIN — ACETAMINOPHEN 650 MG: 325 TABLET, FILM COATED ORAL at 02:18

## 2023-10-09 RX ADMIN — HEPARIN SODIUM 1000 UNITS/HR: 10000 INJECTION, SOLUTION INTRAVENOUS at 00:15

## 2023-10-09 RX ADMIN — METOPROLOL TARTRATE 25 MG: 25 TABLET, FILM COATED ORAL at 08:24

## 2023-10-09 ASSESSMENT — ACTIVITIES OF DAILY LIVING (ADL)
DIFFICULTY_EATING/SWALLOWING: NO
ADLS_ACUITY_SCORE: 18
CHANGE_IN_FUNCTIONAL_STATUS_SINCE_ONSET_OF_CURRENT_ILLNESS/INJURY: NO
WALKING_OR_CLIMBING_STAIRS_DIFFICULTY: NO
CONCENTRATING,_REMEMBERING_OR_MAKING_DECISIONS_DIFFICULTY: NO
DOING_ERRANDS_INDEPENDENTLY_DIFFICULTY: NO
ADLS_ACUITY_SCORE: 18
ADLS_ACUITY_SCORE: 18
WEAR_GLASSES_OR_BLIND: NO
FALL_HISTORY_WITHIN_LAST_SIX_MONTHS: NO
DRESSING/BATHING_DIFFICULTY: NO
ADLS_ACUITY_SCORE: 18
TOILETING_ISSUES: NO
ADLS_ACUITY_SCORE: 18
ADLS_ACUITY_SCORE: 35

## 2023-10-09 NOTE — ED PROVIDER NOTES
History     Chief Complaint:  Palpitations       HPI   Vladimir Gallego is a 38 year old male who presents with palpitations that started a couple hours ago, noted that he felt his old A-fib, which led him to present to a hospital in nearby [place].  While being seen there he decided that he wanted to be seen back at this hospital since it is in network.      He states that he was diagnosed with A-fib last year, had been taking his medications, but unfortunate had to be cardioverted a few times,, needing an ablation the summer.  Took his medication, specifically his Eliquis, diligently until he ran out over his meds and did not get them filled again about 3 weeks ago.  He states that the episode that brings him here to this hospital today is the first time he has had RVR associated with his A-fib since his ablation.    Denies any chest pain, no fainting          Independent Historian:   Yes, wife is at bedside, confirms above history    Review of External Notes: Yes, I reviewed patient's office visit where he was seen with cardiology is Dr. Lerma on 8 August of this year      Allergies:  Bee Venom     Medications:    apixaban ANTICOAGULANT (ELIQUIS ANTICOAGULANT) 5 MG tablet  cyclobenzaprine (FLEXERIL) 5 MG tablet  methylPREDNISolone (MEDROL DOSEPAK) 4 MG tablet therapy pack  metoprolol succinate ER (TOPROL XL) 25 MG 24 hr tablet        Past Medical History:    Past Medical History:   Diagnosis Date    Paroxysmal atrial fibrillation        Past Surgical History:    Past Surgical History:   Procedure Laterality Date    APPENDECTOMY  2008?    EP ABLATION FOCAL AFIB N/A 7/31/2023    Procedure: Ablation Atrial Fibrilation;  Surgeon: William Domingo MD;  Location: [facility] CARDIAC CATH LAB        Family History:    family history includes Factor V Leiden deficiency in his brother and paternal half-sister; Hypertension in his father and mother.    Social History:   reports that he has never smoked. He has never used  smokeless tobacco. He reports current alcohol use. He reports that he does not use drugs.  PCP: No Ref-Primary, Physician     Physical Exam   Patient Vitals for the past 24 hrs:   BP Temp Temp src Pulse Resp SpO2   10/08/23 2158 (!) 147/129 98.2  F (36.8  C) Temporal (!) 177 16 95 %        Physical Exam  Vitals: reviewed by me  General: Pt seen on [facility], pleasant, cooperative, and alert to conversation  Eyes: Tracking well, clear conjunctiva BL  ENT: MMM, midline trachea.   Lungs: No tachypnea, no accessory muscle use. No respiratory distress.   CV: Rate as above, rapid rate.  Variable rate  MSK: no joint effusion.  No evidence of trauma  Skin: No rash  Neuro: Clear speech and no facial droop.  Psych: Not RIS, no e/o AH/VH          Emergency Department Course   EKG was taken at 21: 51 on 10/8/2023.  Ventricular rate is 173, rhythm is A-fib with rapid ventricular response.  MI interval is unmeasurable, QRS duration is 82 ms.  QT QTc is 274 and 464 ms respectively.  Nonspecific ST changes.      Laboratory:  Labs Ordered and Resulted from Time of ED Arrival to Time of ED Departure   CBC WITH PLATELETS AND DIFFERENTIAL - Abnormal       Result Value    WBC Count 10.4      RBC Count 5.55      Hemoglobin 16.9      Hematocrit 48.3      MCV 87      MCH 30.5      MCHC 35.0      RDW 11.5      Platelet Count 324      % Neutrophils 82      % Lymphocytes 9      % Monocytes 6      Mids % (Monos, Eos, Basos)        % Eosinophils 1      % Basophils 0      % Immature Granulocytes 2      NRBCs per 100 WBC 0      Absolute Neutrophils 8.5 (*)     Absolute Lymphocytes 1.0      Absolute Monocytes 0.7      Mids Abs (Monos, Eos, Basos)        Absolute Eosinophils 0.1      Absolute Basophils 0.0      Absolute Immature Granulocytes 0.2      Absolute NRBCs 0.0     BASIC METABOLIC PANEL            Emergency Department Course & Assessments:        Interventions:  Medications   diltiazem (CARDIZEM) injection 10 mg (has no administration  in time range)   diltiazem (CARDIZEM) 125 mg in sodium chloride 0.9 % 125 mL infusion (has no administration in time range)   apixaban ANTICOAGULANT (ELIQUIS) tablet 10 mg (has no administration in time range)     Followed by   apixaban ANTICOAGULANT (ELIQUIS) tablet 5 mg (has no administration in time range)   sodium chloride 0.9% BOLUS 1,000 mL (1,000 mLs Intravenous $New Bag 10/8/23 0185)               Social Determinants of Health affecting care:   Healthcare Access/Compliance      Disposition:  The patient was admitted to the hospital under the care of Dr. Dickson.     Impression & Plan        Medical Decision Making:  This is a pleasant 38-year-old male presents emergency room with appears to be A-fib with RVR.  He has been off his blood thinner for the last several weeks and is supposed to be on it consistently, which does give him hesitation when it comes to cardioversion.  As such, we have decided as a team to do diltiazem, and have him admitted here to start medications again, as I suspect that this may help with his A-fib recurrence.  He also may benefit from seeing cardiology since his A-fib is now shown itself to be refractory to the ablation.  He is hemodynamically stable here, does not need to be emergently cardioverted, though we are monitoring him very carefully.  We will plan for electrolyte repletion, IV fluids, and diltiazem with restarting his Eliquis as well    Critical Care time:  was 30 minutes for this patient excluding procedures.    Diagnosis:    ICD-10-CM    1. Atrial fibrillation with RVR (H)  I48.91                   10/8/2023   Nile Goyal*        Nile Goyal MD  10/08/23 3230

## 2023-10-09 NOTE — PROGRESS NOTES
Echo findings were reviewed with the patient.  Normal LV function.  He can be discharged on metoprolol and Eliquis.  Follow-up in the outpatient with EP.  Avoid alcohol altogether.

## 2023-10-09 NOTE — PLAN OF CARE
Goal Outcome Evaluation:  Echo results seen by provider. Discharge orders rec'd. Pt given discharge meds of metoprolol and eliquis. Review side effects and what to monitor with both meds. Pt has follow up with EP nurse next month. Pt has all belongings. Discharge to home with significant other.    Plan of Care Reviewed With: patient, spouse

## 2023-10-09 NOTE — PHARMACY-ADMISSION MEDICATION HISTORY
Pharmacist Admission Medication History    Admission medication history is complete. The information provided in this note is only as accurate as the sources available at the time of the update.    Information Source(s): Patient via phone    Pertinent Information: Stopped Eliquis and metoprolol about a month ago after ran out of pills.     Changes made to PTA medication list:  Added: ibuprofen, glucosamine  Deleted: methylpred dose pack x 6 days complete  Changed: None       Allergies reviewed with patient and updates made in EHR: yes    Medication History Completed By: Ginna Lopez RPH 10/9/2023 7:57 AM    PTA Med List   Medication Sig Last Dose    apixaban ANTICOAGULANT (ELIQUIS ANTICOAGULANT) 5 MG tablet Take 1 tablet (5 mg) by mouth 2 times daily More than a month    cyclobenzaprine (FLEXERIL) 5 MG tablet Take 1-2 tablets (5-10 mg) by mouth 3 times daily as needed for muscle spasms Past Week    glucosamine 500 MG CAPS capsule Take 500 mg by mouth daily Past Week    ibuprofen (ADVIL/MOTRIN) 200 MG tablet Take 400 mg by mouth every 4 hours as needed for pain  at prn    metoprolol succinate ER (TOPROL XL) 25 MG 24 hr tablet Take 1 tablet (25 mg) by mouth daily More than a month

## 2023-10-09 NOTE — H&P
[facility]    History and Physical - Hospitalist Service       Date of Admission:  10/8/2023    Assessment & Plan      Vladimir Gallego is a 38 year old male admitted on 10/8/2023. He presents to the emergency department with rapid palpitations and some chest heaviness.  Found to have recurrent atrial fibrillation with rapid ventricular rate, heart rate in the 180s range, but improving with rate control.    Paroxysmal atrial fibrillation with rapid ventricular rate: Patient with significantly elevated heart rates with recurrent atrial fibrillation.  Heart rate into the 180s range.  Has had several brief episodes of rapid palpitations resulting in lightheadedness (presyncope) over the past weeks which likely represented A-fib RVR as well.  Underwent ablation in July with Dr. Domingo and came off of both metoprolol and Eliquis approximately 3 weeks ago.  [id number] of 0.  -Cardiology consulted.  Hoping for electrophysiology consult for potential repeat ablation versus JENNIFER with cardioversion  -Heparin drip at this time; anticipate back on DOAC following cardioversion versus ablation.  If neither of these are pursued, would not necessarily require anticoagulation at discharge given [id number] score  -If spontaneous conversion, consider flecainide if repeat ablation is not pursued.  -IV diltiazem drip.  IMC status while on diltiazem infusion  -Metoprolol tartrate 25 mg twice daily.  Prior to initial A-fib and ablation he reports that his resting sinus heart rate was in the 40s to 50s range while he was training for marathons, but since this time he has been more sedentary and tells me his resting heart rate has been in the 70s to 80s range.  Watch for bradycardia with conversion, but with reported heart rate in the 70s on metoprolol XL 25 daily, anticipate he will be able to tolerate metoprolol tartrate 25 twice daily for now  -Ongoing use of dental device for sleep apnea  -Avoidance of  alcohol, caffeine  -As patient is generally quite symptomatic when he has atrial fibrillation and has not had sustained episodes of A-fib, risk of stroke with cardioversion is quite low.  Discussed this with patient on admission.  He has had some paroxysmal episodes over the past 2 weeks most likely, but not sustained.  Patient's preference is to avoid cardioversion if risk of stroke when we discussed this.  -Add on TSH    Addendum: Patient reported some 2 out of 10 chest pressure after admission to the floor.  Per nursing, had some mild diaphoresis.  Met with patient, and he tells me that he did not have the sensation of being cold or clammy, tells me that he felt sweaty because he typically does not sleep in sweatpants.  Feels no different than he did in the emergency department.  -Troponin added onto initial labs.  Some degree of demand ischemia would not be unexpected, and if significant elevation, would trend.  Repeat troponin now as well as it has already been several hours since initial labs were drawn in the emergency department.       Diet:  NPO per procedural guidelines 3 AM  DVT Prophylaxis: Heparin infusion  Marion Catheter: Not present  Lines: None     Cardiac Monitoring: None  Code Status:  Full code    Clinically Significant Risk Factors Present on Admission             # Anion Gap Metabolic Acidosis: Highest Anion Gap = 19 mmol/L in last 2 days, will monitor and treat as appropriate   # Drug Induced Coagulation Defect: home medication list includes an anticoagulant medication                    Disposition Plan    potentially 10/10/2023 following cardiology evaluation, cardioversion versus ablation.  Uncertain if ablation would be performed during hospitalization or as an outpatient, and this would be the largest determinant of length of stay.  Currently requiring IV diltiazem infusion with IMC status       Brayden Dickson MD  Hospitalist Service  [facility]  Securely  message with [employer] (more info)  Text page via [facility] Paging/Directory     ______________________________________________________________________    Chief Complaint   Palpitations, chest heaviness    History is obtained from the patient, chart review, patient's wife at bedside, discussion with Dr. Goyal in the emergency department    History of Present Illness   Vladimir Gallego is a 38 year old male who presents to the emergency department for evaluation of rapid palpitations with known history of atrial fibrillation.    Patient with a history of atrial fibrillation with rapid ventricular rate.  Reports that he had 3 episodes primarily in December 2022, and 2 episodes in June 2023 each requiring cardioversion.  He followed with cardiology, and underwent A-fib ablation in July with Dr. Domingo.  Following this, he was on metoprolol XL 25 mg daily as well as Eliquis anticoagulation, though told that he could discontinue these medications after his prescription ran out.  Prescription appears to have been to cover patient in the periprocedural timeframe as not unanticipated that patient will have recurrent atrial fibrillation up to a few months out from ablation.  Has been off of Eliquis and metoprolol for the past 3 weeks or so.    Somewhat recently, patient injured his back lifting too much weight while working out.  Was given a prescription for Flexeril and a Medrol Dosepak.  No longer on these medications as he completed them.  Has otherwise been feeling well without fevers or chills.    Was in the [facility] today when he developed rapid palpitations with some chest heaviness.  Was seen in emergency department in [place], and found to again be in atrial fibrillation with rapid ventricular rate, heart rates in the 180 range.  As [facility] was out of network for him, he came here to [facility] for further evaluation in the emergency department.  Again, noted to have A-fib RVR with  heart rates in the 180 range.  No hypoxia.    Patient is very much symptomatic when he has palpitations, certainly when they are rapid.  When I inquired as to if he has had symptoms with more controlled atrial fibrillation, he tells me that they are less notable, but has had palpitations with heart rates in the 80s during prior bouts of atrial fibrillation.    Over the past 2 weeks, patient notes several brief episodes, lasting a minute or less, of rapid palpitations associated with lightheadedness.  He has not had any syncopal events.  Likely that these episodes were also A-fib with RVR, as he has very pronounced rapid heart rate when he is in atrial fibrillation.  Currently he is not lightheaded.    Prior echocardiogram noted some mild left atrial enlargement, though this is somewhat discordant from CT coronary study prior to ablation which described severe left atrial enlargement.    Initiating on IV diltiazem as well as oral metoprolol.  Patient tells me that prior to initial diagnosis of atrial fibrillation he was training for marathons and had a resting heart rate in the low 50s range.  Following atrial fibrillation, he was not as active, and his resting heart rate was in the 70s range even on his metoprolol following ablation.  Suggest that he does have some room for rate control, but with his symptoms, long-term management likely better with ablation or rhythm control.      Past Medical History    Past Medical History:   Diagnosis Date    Paroxysmal atrial fibrillation        Past Surgical History   Past Surgical History:   Procedure Laterality Date    APPENDECTOMY  2008?    EP ABLATION FOCAL AFIB N/A 7/31/2023    Procedure: Ablation Atrial Fibrilation;  Surgeon: William Domingo MD;  Location:  [facility] CARDIAC CATH LAB       Prior to Admission Medications   Prior to Admission Medications   Prescriptions Last Dose Informant Patient Reported? Taking?   apixaban ANTICOAGULANT (ELIQUIS ANTICOAGULANT) 5 MG tablet    No No   Sig: Take 1 tablet (5 mg) by mouth 2 times daily   Patient not taking: Reported on 10/3/2023   cyclobenzaprine (FLEXERIL) 5 MG tablet   No No   Sig: Take 1-2 tablets (5-10 mg) by mouth 3 times daily as needed for muscle spasms   methylPREDNISolone (MEDROL DOSEPAK) 4 MG tablet therapy pack   No No   Sig: Follow Package Directions   metoprolol succinate ER (TOPROL XL) 25 MG 24 hr tablet   No No   Sig: Take 1 tablet (25 mg) by mouth daily   Patient not taking: Reported on 10/3/2023      Facility-Administered Medications: None           Physical Exam   Vital Signs: Temp: 98.2  F (36.8  C) Temp src: Temporal BP: (!) 111/93 Pulse: (!) 123   Resp: 20 SpO2: 95 % O2 Device: None (Room air)    Weight: 183 lbs 14.4 oz    General Appearance: Well-appearing 38-year-old male resting comfortably in bed.  He is in no acute distress.    Eyes: No scleral icterus or injection  HEENT: Normocephalic and atraumatic  Respiratory: Breath sounds are clear bilaterally to auscultation with no wheezes or crackles.  No increased work of breathing  Cardiovascular: Irregular rate and rhythm with tachycardia into the 130 range, occasionally jumping up into the 160s.    Lymph/Hematologic: No lower extremity edema present  Skin: No jaundice, no petechiae, no appreciable rash  Musculoskeletal: Fit.  Muscular tone and bulk intact and appropriate for age  Neurologic: Alert, conversant, appropriate conversation.  Mental status is grossly intact.  Psychiatric: Very pleasant, normal affect    Medical Decision Making       75 MINUTES SPENT BY ME on the date of service doing chart review, history, exam, documentation & further activities per the note.      Data     I have personally reviewed the following data over the past 24 hrs:    10.4  \   16.9   / 324     143 104 17.7 /  149 (H)   3.9 20 (L) 0.85 \     Trop: 14 BNP: N/A     TSH: 2.01 T4: N/A A1C: N/A       Imaging results reviewed over the past 24 hrs:   No results found for this or any  previous visit (from the past 24 hour(s)).

## 2023-10-09 NOTE — ED TRIAGE NOTES
Pt left another ED due to out of network insurance and arrived here with a dx of A-Fib.     Triage Assessment       Row Name 10/08/23 6216       Triage Assessment (Adult)    Airway WDL WDL       Respiratory WDL    Respiratory WDL WDL       Skin Circulation/Temperature WDL    Skin Circulation/Temperature WDL WDL       Cardiac WDL    Cardiac WDL X  heart rate tachycardic       Peripheral/Neurovascular WDL    Peripheral Neurovascular WDL WDL       Cognitive/Neuro/Behavioral WDL    Cognitive/Neuro/Behavioral WDL WDL

## 2023-10-09 NOTE — CONSULTS
[facility]    Cardiology Consultation     Date of Admission:  10/8/2023    Assessment & Plan   Vladimir Gallego is a 38 year old male who was admitted on 10/8/2023 recurrent atrial fibrillation with RVR.  The recurrent atrial fibrillation episode might have been triggered by alcohol.  His prior episodes were all preceded by alcohol drinking.  He is now in sinus rhythm.  Since there is a possibility of these episodes been triggered by alcohol, I recommended that he abstain alcohol if possible.  In addition to abstaining alcohol use, we discussed other potential therapeutic options including initiation of antiarrhythmic drugs to lessen recurrent episodes versus EP consult for possible repeat ablation.    PLAN:  Echocardiogram to rule out structural cardiac abnormalities  We will stop heparin infusion and consider short-term anticoagulation with Eliquis given recent ablation and recurrent atrial fibrillation although his NNS1LX5-PVCe score is 0.  No indication for long-term long-term anticoagulation.  Discontinue diltiazem drip.  Continue low-dose oral metoprolol for now.  We will defer to EP with regards to initiation of antiarrhythmic therapy versus repeat ablation.  This discussion can happen in the outpatient.  Likely discharge later today    Deuce Siegel MD, [facility]    High complexity     Deuce Siegel MD, MD, MD    Primary Care Physician   Physician No Ref-Primary    Reason for Consult   Reason for consult: I was asked by the medicine team to evaluate this patient for atrial fibrillation with rapid ventricular response.    History of Present Illness   Vladimir Gallego is a 38 year old male with history of proximal atrial fibrillation status post ablation who is readmitted with recurrent A-fib with RVR.     He was initially found to have atrial fibrillation with rapid ventricular sponsor in December 2022.  No recurrent episodes in June of this year.  All episodes required cardioversion.  He  was evaluated by colleagues from electrophysiology and eventually proceeded with A-fib ablation on July 31, 2023.  He took Eliquis and metoprolol for 2 months following the ablation.  He stopped both medications approximately 3 weeks ago.    He has been noticing recurrent palpitations the past 1 week.  Yesterday while in [facility] he developed rapid palpitations with chest heaviness.  He had couple of alcoholic drinks prior to noticing the palpitations.  Of note, prior episodes also occurred following alcohol use.  He went to the local emergency room where he was noted to be in A-fib with ventricular rate in the 180s.  He subsequently came to our emergency department for further evaluation where repeat ECG revealed atrial fibrillation with RVR.  He was placed on heparin and diltiazem drip.  He subsequently converted to sinus rhythm overnight.    This morning he is resting comfortably.  No cardiac or pulmonary symptoms.    Past Medical History   Past Medical History:   Diagnosis Date    Paroxysmal atrial fibrillation        Past Surgical History   Past Surgical History:   Procedure Laterality Date    APPENDECTOMY  2008?    EP ABLATION FOCAL AFIB N/A 7/31/2023    Procedure: Ablation Atrial Fibrilation;  Surgeon: William Domingo MD;  Location:  [facility]       Prior to Admission Medications   Prior to Admission Medications   Prescriptions Last Dose Informant Patient Reported? Taking?   apixaban ANTICOAGULANT (ELIQUIS ANTICOAGULANT) 5 MG tablet More than a month  No Yes   Sig: Take 1 tablet (5 mg) by mouth 2 times daily   cyclobenzaprine (FLEXERIL) 5 MG tablet Past Week  No Yes   Sig: Take 1-2 tablets (5-10 mg) by mouth 3 times daily as needed for muscle spasms   glucosamine 500 MG CAPS capsule Past Week  Yes Yes   Sig: Take 500 mg by mouth daily   ibuprofen (ADVIL/MOTRIN) 200 MG tablet  at prn  Yes Yes   Sig: Take 400 mg by mouth every 4 hours as needed for pain   metoprolol succinate ER (TOPROL  XL) 25 MG 24 hr tablet More than a month  No Yes   Sig: Take 1 tablet (25 mg) by mouth daily      Facility-Administered Medications: None     Current Facility-Administered Medications   Medication Dose Route Frequency    metoprolol tartrate  25 mg Oral BID    sodium chloride (PF)  3 mL Intracatheter Q8H     Current Facility-Administered Medications   Medication Last Rate    dilTIAZem 5 mg/hr (10/09/23 0812)    heparin 1,000 Units/hr (10/09/23 0744)    Reason anticoagulant not prescribed for atrial fibrillation      - MEDICATION INSTRUCTIONS -       Allergies   Allergies   Allergen Reactions    Bee Venom Swelling    Blood Transfusion Related (Informational Only)      Jehovahs witness       Social History    reports that he has never smoked. He has never used smokeless tobacco. He reports current alcohol use. He reports that he does not use drugs.      Family History   I have reviewed this patient's family history and updated it with pertinent information if needed.  Family History   Problem Relation Age of Onset    Hypertension Mother     Hypertension Father     Factor V Leiden deficiency Brother     Factor V Leiden deficiency Paternal Half-Sister           Review of Systems   A comprehensive review of system was performed and is negative other than that noted in the HPI or here.     Physical Exam   Vital Signs with Ranges  Temp:  [98  F (36.7  C)-98.3  F (36.8  C)] 98  F (36.7  C)  Pulse:  [] 75  Resp:  [10-30] 18  BP: (103-147)/() 126/87  SpO2:  [95 %-98 %] 98 %  Wt Readings from Last 4 Encounters:   10/09/23 83.4 kg (183 lb 14.4 oz)   08/08/23 82.5 kg (181 lb 14.4 oz)   08/08/23 85.3 kg (188 lb)   07/31/23 81.6 kg (180 lb)     No intake/output data recorded.      Vitals: /87   Pulse 75   Temp 98  F (36.7  C) (Oral)   Resp 18   Wt 83.4 kg (183 lb 14.4 oz)   SpO2 98%   BMI 28.80 kg/m      Physical Exam:   General - Alert and oriented to time place and person in no acute distress  Eyes - No  scleral icterus  HEENT - Neck supple, moist mucous membranes  Cardiovascular - RRR, no murmurs  Extremities - There is no edema  Respiratory -clear to auscultation  Skin - No pallor or cyanosis  Gastrointestinal - Non tender and non distended without rebound or guarding  Psych - Appropriate affect   Neurological - No gross motor neurological focal deficits    No lab results found in last 7 days.    Invalid input(s): TROPONINIES    Recent Labs   Lab 10/09/23  0133 10/08/23  2216   WBC  --  10.4   HGB  --  16.9   MCV  --  87   PLT  --  324   NA  --  143   POTASSIUM  --  3.9   CHLORIDE  --  104   CO2  --  20*   BUN  --  17.7   CR  --  0.85   GFRESTIMATED  --  >90   ANIONGAP  --  19*   SHAI  --  9.6   * 144*     Recent Labs   Lab Test 12/16/22  1527   CHOL 195   HDL 49   *   TRIG 102     Recent Labs   Lab 10/08/23  2216   WBC 10.4   HGB 16.9   HCT 48.3   MCV 87        No results for input(s): PH, PHV, PO2, PO2V, SAT, PCO2, PCO2V, HCO3, HCO3V in the last 168 hours.  No results for input(s): NTBNPI, NTBNP in the last 168 hours.  No results for input(s): DD in the last 168 hours.  No results for input(s): SED, CRP in the last 168 hours.  Recent Labs   Lab 10/08/23  2216        Recent Labs   Lab 10/08/23  2216   TSH 2.01

## 2023-10-09 NOTE — DISCHARGE SUMMARY
[facility]  Hospitalist Discharge Summary      Date of Admission:  10/8/2023  Date of Discharge:  10/9/2023  Discharging Provider: Ashley Smith DO  Discharge Service: Hospitalist Service    Discharge Diagnoses   Paroxysmal atrial fibrillation with rapid ventricular rate    Clinically Significant Risk Factors          Follow-ups Needed After Discharge   Follow-up Appointments     Follow-up and recommended labs and tests       Follow up with primary care provider, within 7 days for hospital   follow-up    Follow up with electrophysiology as able. Keep your current appointment   date (11/1/23) for now--they may call to schedule you sooner if there is   an opening.            Discharge Disposition   Discharged to home  Condition at discharge: Stable    Hospital Course   Vladimir Gallego is a 38 year old male admitted on 10/8/2023. He presents to the emergency department with rapid palpitations and some chest heaviness.  Found to have recurrent atrial fibrillation with rapid ventricular rate, heart rate in the 180s range, but improving with rate control. Converted to sinus with initiation of metoprolol and IV diltiazem 7:30AM on 10/9. Remained in sinus while on oral metoprolol and recommended discharge home with outpatient EP follow-up.     Paroxysmal atrial fibrillation with rapid ventricular rate  Patient with significantly elevated heart rates with recurrent atrial fibrillation.  Heart rate into the 180s range. Has had several brief episodes of rapid palpitations resulting in lightheadedness (presyncope) over the past weeks which likely represented A-fib RVR as well. Underwent ablation in July with Dr. Domingo and came off of both metoprolol and Eliquis approximately 3 weeks ago. [id number]-BLNy of 0. TSH WNL.  *converted to sinus rhythm 730AM on 10/9 after being on heparin/diltiazem gtt and receiving oral metoprolol.  *Echo: EF 55-60% with normal RV, IVC normal. No pericardial effusion  -  cardiology appreciated, recommends follow up with EP in outpatient setting (has 11/1/23 appt) to discuss additional antiarrhythmic therapy vs repeat ablation  - recommended resume eliquis 5mg BID and metoprolol succinate 25mg once daily  - Avoidance of alcohol, caffeine  -follow up with PCP for hospital follow-up.      Consultations This Hospital Stay   PHARMACY IP CONSULT  CARDIOLOGY IP CONSULT    Code Status   Full Code    Time Spent on this Encounter   IAshley DO, personally saw the patient today and spent greater than 30 minutes discharging this patient.       Ashley Smith DO  [facility]  [address]  [place] [address]  Phone: [phone]  ______________________________________________________________________    Physical Exam   Vital Signs: Temp: 97.7  F (36.5  C) Temp src: Oral BP: 124/84 Pulse: 80   Resp: 16 SpO2: 98 % O2 Device: None (Room air)    Weight: 183 lbs 14.4 oz    Patient seen and examined. He is doing well. Has felt better since heart rate became controlled. No further episodes of shortness of breath, palpitations. Converted around 730AM. Discussed alcohol being a trigger for him, even one drink can push him into atrial fib. He plans to stop drinking alcohol. No chest pain. Stable for discharge to home.    Constitutional: Awake, alert, cooperative, no apparent distress  Respiratory: Clear to auscultation bilaterally, no crackles or wheezing  Cardiovascular: Regular rate and rhythm, normal S1 and S2, and no murmur noted  GI: Normal bowel sounds, soft, non-distended, non-tender  Skin/Integumen: No rashes, no cyanosis, no edema  Other:         Primary Care Physician   Physician No Ref-Primary    Discharge Orders      Reason for your hospital stay    Atrial fibrillation with rapid response (fast heart rate). Converted to normal rhythm with IV diltiazem and metoprolol     Follow-up and recommended labs and tests     Follow up with  primary care provider, within 7 days for hospital follow-up    Follow up with electrophysiology as able. Keep your current appointment date (11/1/23) for now--they may call to schedule you sooner if there is an opening.     Activity    Your activity upon discharge: activity as tolerated     Discharge Instructions    1. Take eliquis 5mg twice daily.    2. Take metoprolol succinate 25mg once daily. You received a short acting version of this medication in the morning on 10/9, so should take first dose evening of 10/9. You can keep the dosing in the evening if you prefer, or you can switch to taking in the morning starting 10/10. It won't be a big deal to have tonight's and tomorrow morning's doses overlap a little, but you may notice that you feel a little more tired when the dose has been effectively doubled. Should improve by evening 10/10     Diet    Follow this diet upon discharge:  Regular Diet Adult. Avoid alcohol as this seems to be a trigger for your atrial fibrillation       Significant Results and Procedures   Most Recent 3 CBC's:  Recent Labs   Lab Test 10/08/23  2216 07/31/23  0651 06/17/23  0214   WBC 10.4 5.2 5.5   HGB 16.9 15.4 15.6   MCV 87 88 89    226 261     Most Recent 3 BMP's:  Recent Labs   Lab Test 10/09/23  0133 10/08/23  2216 07/31/23  0651 06/17/23  0214   NA  --  143 139 143   POTASSIUM  --  3.9 4.0 3.5   CHLORIDE  --  104 104 107   CO2  --  20* 27 23   BUN  --  17.7 15.7 20.4*   CR  --  0.85 0.83 0.84   ANIONGAP  --  19* 8 13   SHAI  --  9.6 9.8 9.2   * 144* 119* 107*     Most Recent 3 Troponin's:No lab results found.  Most Recent TSH and T4:  Recent Labs   Lab Test 10/08/23  2216 06/03/23  0128   TSH 2.01 4.21*   T4  --  1.17   ,   Results for orders placed or performed during the hospital encounter of 10/08/23   Echocardiogram Complete     Value    LVEF  55-60%    Narrative    [id number]  [id number]  [id number]  [id number]^CARLOS^BETHANIE^PEDRO     [facility]  [facility]  Echocardiography Laboratory  [address] [place] [address]     Name: MAGALY ARMAS  MRN: [id number]  : 1985  Study Date: 10/09/2023 01:30 PM  Age: 38 yrs  Gender: Male  Patient Location: [facility]  Reason For Study: Atrial Fibrillation  Ordering Physician: BETHANIE GONZALEZ  Performed By: Carmen Helms     BSA: 1.9 m2  Height: 67 in  Weight: 183 lb  HR: 79  BP: 126/87 mmHg  ______________________________________________________________________________  Procedure  Complete Portable Echo Adult. Optison (NDC #[id number]) given intravenously.  ______________________________________________________________________________  Interpretation Summary     The visual ejection fraction is 55-60%.  The right ventricle is normal in size and function.  The inferior vena cava was normal in size with preserved respiratory  variability.  There is no pericardial effusion.  ______________________________________________________________________________  Left Ventricle  The left ventricle is normal in structure, function and size. The visual  ejection fraction is 55-60%. Diastolic Doppler findings (E/E' ratio and/or  other parameters) suggest left ventricular filling pressures are normal.     Right Ventricle  The right ventricle is normal in size and function.     Atria  Normal left atrial size. Borderline right atrial enlargement.     Mitral Valve  The mitral valve is normal in structure and function. There is trace mitral  regurgitation. There is no mitral valve stenosis.     Tricuspid Valve  The tricuspid valve is not well visualized, but is grossly normal. Right  ventricular systolic pressure could not be approximated due to inadequate  tricuspid regurgitation.     Aortic Valve  The aortic valve is normal in structure and function.     Pulmonic Valve  The pulmonic valve is not well visualized.     Vessels  The aortic root is normal size. The inferior vena cava was normal in size with  preserved respiratory  variability.     Pericardium  There is no pericardial effusion.     ______________________________________________________________________________  MMode/2D Measurements & Calculations  IVSd: 1.0 cm  LVIDd: 4.9 cm  LVIDs: 3.0 cm  LVPWd: 1.0 cm  FS: 39.0 %     LV mass(C)d: 180.4 grams  LV mass(C)dI: 92.6 grams/m2  Ao root diam: 3.5 cm  LA dimension: 3.8 cm  asc Aorta Diam: 3.2 cm  LA/Ao: 1.1  Ao root diam index Ht(cm/m): 2.0  Ao root diam index BSA (cm/m2): 1.8  Asc Ao diam index BSA (cm/m2): 1.7  Asc Ao diam index Ht(cm/m): 1.9  LA Volume (BP): 52.7 ml  LA Volume Index (BP): 27.0 ml/m2     RV Base: 4.0 cm  RWT: 0.42  TAPSE: 2.3 cm     Doppler Measurements & Calculations  MV E max florentino: 64.1 cm/sec  MV A max florentino: 71.0 cm/sec  MV E/A: 0.90  MV dec slope: 371.5 cm/sec2  MV dec time: 0.17 sec  E/E' av.0  Lateral E/e': 5.6  Medial E/e': 8.3     ______________________________________________________________________________  Report approved by: Mekhi Robledo 10/09/2023 04:02 PM             Discharge Medications   Current Discharge Medication List        START taking these medications    Details   acetaminophen (TYLENOL) 325 MG tablet Take 2 tablets (650 mg) by mouth every 6 hours as needed for mild pain or other (and adjunct with moderate or severe pain or per patient request)    Associated Diagnoses: Atrial fibrillation with rapid ventricular response (H)           CONTINUE these medications which have CHANGED    Details   apixaban ANTICOAGULANT (ELIQUIS ANTICOAGULANT) 5 MG tablet Take 1 tablet (5 mg) by mouth 2 times daily  Qty: 60 tablet, Refills: 1    Associated Diagnoses: Atrial fibrillation with rapid ventricular response (H)      metoprolol succinate ER (TOPROL XL) 25 MG 24 hr tablet Take 1 tablet (25 mg) by mouth daily  Qty: 30 tablet, Refills: 2    Associated Diagnoses: Atrial fibrillation with rapid ventricular response (H)           CONTINUE these medications which have NOT CHANGED    Details    cyclobenzaprine (FLEXERIL) 5 MG tablet Take 1-2 tablets (5-10 mg) by mouth 3 times daily as needed for muscle spasms  Qty: 20 tablet, Refills: 0    Associated Diagnoses: Acute right-sided low back pain without sciatica      glucosamine 500 MG CAPS capsule Take 500 mg by mouth daily           STOP taking these medications       ibuprofen (ADVIL/MOTRIN) 200 MG tablet Comments:   Reason for Stopping:             Allergies   Allergies   Allergen Reactions    Bee Venom Swelling    Blood Transfusion Related (Informational Only)      Jehovahs witness

## 2023-10-09 NOTE — PLAN OF CARE
VS:  Stable BP  Assist by: IND     Cardiac: Irregular R&R; Report of chest pressure on separate note   Neuro: A&Ox4  CMS: Denies paresthesias   Respi: clear to auscultation; on RA    : Continent; voiding freely   GI: Abdo soft, non - tender; BS audible; Last POLLY 10/08/23    Strip/Tele: AFIB RVR    Pressure areas/Skin:    - PA's intact          LDA's:     - PIVC to R lower arm ongoing Heparin gtt   Next Xa at: _1200h_    - PIVC to R ACF ongoing Dilt gtt      Plans:    > electrophysiology consult for potential repeat ablation versus JENNIFER with cardioversion   > Heparin drip   > If spontaneous conversion, consider flecainide   > IV diltiazem drip. IMC status while on diltiazem infusion   > Metoprolol tartrate 25 mg BID  > Avoidance of alcohol, caffeine            Problem: Plan of Care - These are the overarching goals to be used throughout the patient stay.    Goal: Absence of Hospital-Acquired Illness or Injury  Intervention: Identify and Manage Fall Risk  Recent Flowsheet Documentation  Taken 10/9/2023 0100 by Luda Gallardo RN  Safety Promotion/Fall Prevention:   activity supervised   increase visualization of patient   lighting adjusted   nonskid shoes/slippers when out of bed   room near nurse's station   safety round/check completed  Intervention: Prevent Skin Injury  Recent Flowsheet Documentation  Taken 10/9/2023 0240 by Luda Gallardo RN  Body Position: supine  Goal: Optimal Comfort and Wellbeing  Intervention: Monitor Pain and Promote Comfort  Recent Flowsheet Documentation  Taken 10/9/2023 0218 by Luda Gallardo RN  Pain Management Interventions: medication (see MAR)  Goal: Readiness for Transition of Care  Intervention: Mutually Develop Transition Plan  Recent Flowsheet Documentation  Taken 10/9/2023 0100 by Luda Gallardo, RN  Equipment Currently Used at Home: none

## 2023-10-09 NOTE — ED NOTES
[facility]  ED Nurse Handoff Report    ED Chief complaint: Palpitations      ED Diagnosis:   Final diagnoses:   Atrial fibrillation with RVR (H)       Code Status: Full Code    Allergies:   Allergies   Allergen Reactions    Bee Venom Swelling       Patient Story: 38 male HX afib, palpitations started approx 2200 present to a hospital in nearby [place].  While being seen there he decided that he wanted to be seen back at this hospital since it is in network.       He states that he was diagnosed with A-fib last year, had been taking his medications, but unfortunately had to be cardioverted a few times,, needing an ablation the summer.  Took his medication, specifically his Eliquis, diligently until he ran out over his meds and did not get them filled again about 3 weeks ago.  He states that the episode that brings him here to this hospital today is the first time he has had RVR associated with his A-fib since his ablation.  Recently on a steroid  Focused Assessment:  's, Afib with RVR.  Pt arrives with IV, no labs avail from [place] ED    Treatments and/or interventions provided: diltiazem, heparin, IVF    Patient's response to treatments and/or interventions:  Rhythm slowed   Results for orders placed or performed during the hospital encounter of 10/08/23   Basic metabolic panel     Status: Abnormal   Result Value Ref Range    Sodium 143 135 - 145 mmol/L    Potassium 3.9 3.4 - 5.3 mmol/L    Chloride 104 98 - 107 mmol/L    Carbon Dioxide (CO2) 20 (L) 22 - 29 mmol/L    Anion Gap 19 (H) 7 - 15 mmol/L    Urea Nitrogen 17.7 6.0 - 20.0 mg/dL    Creatinine 0.85 0.67 - 1.17 mg/dL    GFR Estimate >90 >60 mL/min/1.73m2    Calcium 9.6 8.6 - 10.0 mg/dL    Glucose 144 (H) 70 - 99 mg/dL   [place] Draw     Status: None    Narrative    The following orders were created for panel order [place] Draw.  Procedure                               Abnormality         Status                     ---------                                -----------         ------                     Extra Blue Top Tube[[id number]]                              Final result                 Please view results for these tests on the individual orders.   CBC with platelets and differential     Status: Abnormal   Result Value Ref Range    WBC Count 10.4 4.0 - 11.0 10e3/uL    RBC Count 5.55 4.40 - 5.90 10e6/uL    Hemoglobin 16.9 13.3 - 17.7 g/dL    Hematocrit 48.3 40.0 - 53.0 %    MCV 87 78 - 100 fL    MCH 30.5 26.5 - 33.0 pg    MCHC 35.0 31.5 - 36.5 g/dL    RDW 11.5 10.0 - 15.0 %    Platelet Count 324 150 - 450 10e3/uL    % Neutrophils 82 %    % Lymphocytes 9 %    % Monocytes 6 %    Mids % (Monos, Eos, Basos)      % Eosinophils 1 %    % Basophils 0 %    % Immature Granulocytes 2 %    NRBCs per 100 WBC 0 <1 /100    Absolute Neutrophils 8.5 (H) 1.6 - 8.3 10e3/uL    Absolute Lymphocytes 1.0 0.8 - 5.3 10e3/uL    Absolute Monocytes 0.7 0.0 - 1.3 10e3/uL    Mids Abs (Monos, Eos, Basos)      Absolute Eosinophils 0.1 0.0 - 0.7 10e3/uL    Absolute Basophils 0.0 0.0 - 0.2 10e3/uL    Absolute Immature Granulocytes 0.2 <=0.4 10e3/uL    Absolute NRBCs 0.0 10e3/uL   Extra Blue Top Tube     Status: None   Result Value Ref Range    Hold Specimen [facility]    CBC with platelets + differential     Status: Abnormal    Narrative    The following orders were created for panel order CBC with platelets + differential.  Procedure                               Abnormality         Status                     ---------                               -----------         ------                     CBC with platelets and d...[[id number]]  Abnormal            Final result                 Please view results for these tests on the individual orders.       To be done/followed up on inpatient unit:      Does this patient have any cognitive concerns?:  none    Activity level - Baseline/Home:  Independent  Activity Level - Current:   Independent    Patient's Preferred language: English    Needed?: No    Isolation: None  Infection: Not Applicable  Patient tested for COVID 19 prior to admission: NO  Bariatric?: No    Vital Signs:   Vitals:    10/08/23 2230 10/08/23 2235 10/08/23 2240 10/08/23 2245   BP: (!) 111/93      Pulse: (!) 160 (!) 172 (!) 161 (!) 123   Resp: 15 26 20 20   Temp:       TempSrc:       SpO2: 96% 96% 95% 95%       Cardiac Rhythm:     Was the PSS-3 completed:   Yes  What interventions are required if any?  None               Family Comments:     OBS brochure/video discussed/provided to patient/family: N/A              Name of person given brochure if not patient:               Relationship to patient:     For the majority of the shift this patient's behavior was Green.   Behavioral interventions performed were .    ED NURSE PHONE NUMBER: [phone]

## 2023-10-10 LAB
ATRIAL RATE - MUSE: 138 BPM
DIASTOLIC BLOOD PRESSURE - MUSE: NORMAL MMHG
INTERPRETATION ECG - MUSE: NORMAL
P AXIS - MUSE: NORMAL DEGREES
PR INTERVAL - MUSE: NORMAL MS
QRS DURATION - MUSE: 92 MS
QT - MUSE: 308 MS
QTC - MUSE: 446 MS
R AXIS - MUSE: 48 DEGREES
SYSTOLIC BLOOD PRESSURE - MUSE: NORMAL MMHG
T AXIS - MUSE: 21 DEGREES
VENTRICULAR RATE- MUSE: 126 BPM

## 2023-10-14 DIAGNOSIS — M54.50 ACUTE RIGHT-SIDED LOW BACK PAIN WITHOUT SCIATICA: ICD-10-CM

## 2023-10-16 RX ORDER — CYCLOBENZAPRINE HCL 5 MG
5-10 TABLET ORAL 3 TIMES DAILY PRN
Qty: 15 TABLET | Refills: 1 | OUTPATIENT
Start: 2023-10-16

## 2023-11-01 ENCOUNTER — OFFICE VISIT (OUTPATIENT)
Dept: CARDIOLOGY | Facility: CLINIC | Age: 38
End: 2023-11-01
Payer: COMMERCIAL

## 2023-11-01 VITALS
SYSTOLIC BLOOD PRESSURE: 118 MMHG | DIASTOLIC BLOOD PRESSURE: 80 MMHG | OXYGEN SATURATION: 96 % | WEIGHT: 182 LBS | BODY MASS INDEX: 28.56 KG/M2 | HEART RATE: 85 BPM | HEIGHT: 67 IN

## 2023-11-01 DIAGNOSIS — I48.0 PAF (PAROXYSMAL ATRIAL FIBRILLATION) (H): Primary | ICD-10-CM

## 2023-11-01 PROCEDURE — 93000 ELECTROCARDIOGRAM COMPLETE: CPT | Performed by: PHYSICIAN ASSISTANT

## 2023-11-01 PROCEDURE — 99214 OFFICE O/P EST MOD 30 MIN: CPT | Performed by: PHYSICIAN ASSISTANT

## 2023-11-01 NOTE — LETTER
"11/1/2023    Physician No Ref-Primary  No address on file    RE: Vladimir Gallego       Dear Colleague,     I had the pleasure of seeing Vladimir Gallego in the [facility].    Electrophysiology Clinic Progress Note    Vladimir Gallego MRN# [id number]   YOB: 1985 Age: 38 year old     Primary cardiology team: Dr. Rosa (general), Dr. Domingo (EP)         Assessment and Plan     In summary, Vladimir Gallego presents today for follow up after an admission for rapid symptomatic RF requiring diltiazem gtt with subsequent spontaneous conversion. Episode likely triggered by ETOH use (although this was minimal) and non-compliance with metoprolol. Since he was still in the 3 month \"healing window,\" we will continue to monitor at this time. Encouraged compliance with metoprolol for now (can consider stopping this in follow-up) and continued ETOH in moderation. He will continue Eliquis for another week, then stop it for a [id number] score of 0.     Follow-up:  With Dr. Domingo in 3 months.     SOUMYA Diaz [facility] - [facility]         History of Presenting Illness     Vladimir Gallego is a pleasant 38 year old patient who is a marathon runner, and was doing quite well until this past December when he presented with symptomatic AF with RVR after having a few drinks required cardioversion.  Since then he had additional cardioversions with the last one being 6/2/23.  He had a few drinks both times. Patient is known to have mildly enlarged left atrium but has preserved biventricular function. He has no family history of atrial fibrillation in his parents or siblings. He works in construction.    When he saw Dr. Domingo in clinic July 2023, AF ablation was recommended. This took place on 7/31/23. He had successful wide circumferential PV isolation and empiric CTI ablation.     He did well after this, until he presented to the ED 10/8/23 with recurrent rapid symptomatic AF requiring " "diltiazem gtt with subsequent spontaneous conversion. He'd had 1.5 drinks of alcohol prior to the episode. He also had stopped taking his metoprolol. He was advised to re-start it.     Today, Vladimir returns to clinic stating he's feeling better overall. No AF episodes since his hospitalization. Patient denies shortness of breath, PND, orthopnea, edema, claudication, palpitations, near syncope or syncope. Exercising without issue. EKG today shows NSR @ 82 bpm.          Review of Systems     12-pt ROS is negative except for as noted in the HPI.          Physical Exam     Vitals: /80 (BP Location: Right arm, Patient Position: Sitting, Cuff Size: Adult Regular)   Pulse 85   Ht 1.702 m (5' 7\")   Wt 82.6 kg (182 lb)   SpO2 96%   BMI 28.51 kg/m    Wt Readings from Last 10 Encounters:   11/01/23 82.6 kg (182 lb)   10/09/23 83.4 kg (183 lb 14.4 oz)   08/08/23 82.5 kg (181 lb 14.4 oz)   08/08/23 85.3 kg (188 lb)   07/31/23 81.6 kg (180 lb)   07/07/23 80.3 kg (177 lb)   06/17/23 79.2 kg (174 lb 9.7 oz)   06/03/23 77.1 kg (170 lb)   05/24/23 77.7 kg (171 lb 6.4 oz)   05/22/23 77.1 kg (170 lb)       Constitutional:  Patient is pleasant, alert, cooperative, and in NAD.  HEENT:  NCAT. PERRLA. EOM's intact.   Neck:  CVP appears normal. No carotid bruits.   Pulmonary: Normal respiratory effort. CTAB.   Cardiac: RRR, normal S1/S2, no S3/S4, no murmur or rub.   Abdomen:  Non-tender abdomen, no hepatosplenomegaly appreciated.   Vascular: Pulses in the upper and lower extremities are 2+ and equal bilaterally. R FA access site closed, with no hematoma, erythema, or tenderness. Expected ecchymosis surrounding.   Extremities: No edema, erythema, cyanosis or tenderness appreciated.  Skin:  No rashes or lesions appreciated.   Neurological:  No gross motor or sensory deficits.   Psych: Appropriate affect.          Data   Labs reviewed:  Recent Labs   Lab Test 10/08/23  2216 06/03/23  0128 12/16/22  1527   LDL  --   --  126*   HDL  " "--   --  49   NHDL  --   --  146*   CHOL  --   --  195   TRIG  --   --  102   TSH 2.01 4.21* 1.65       Lab Results   Component Value Date    WBC 10.4 10/08/2023    WBC 5.6 01/29/2021    RBC 5.55 10/08/2023    RBC 5.12 01/29/2021    HGB 16.9 10/08/2023    HGB 15.4 01/29/2021    HCT 48.3 10/08/2023    HCT 45.2 01/29/2021    MCV 87 10/08/2023    MCV 88 01/29/2021    MCH 30.5 10/08/2023    MCH 30.1 01/29/2021    MCHC 35.0 10/08/2023    MCHC 34.1 01/29/2021    RDW 11.5 10/08/2023    RDW 11.5 01/29/2021     10/08/2023     01/29/2021       Lab Results   Component Value Date     10/08/2023     01/29/2021    POTASSIUM 3.9 10/08/2023    POTASSIUM 3.8 01/29/2021    CHLORIDE 104 10/08/2023    CHLORIDE 107 01/29/2021    CO2 20 (L) 10/08/2023    CO2 28 01/29/2021    ANIONGAP 19 (H) 10/08/2023    ANIONGAP 4 01/29/2021     (H) 10/09/2023     (H) 10/08/2023    GLC 83 01/29/2021    BUN 17.7 10/08/2023    BUN 13 01/29/2021    CR 0.85 10/08/2023    CR 0.95 01/29/2021    GFRESTIMATED >90 10/08/2023    GFRESTIMATED >90 01/29/2021    GFRESTBLACK >90 01/29/2021    SHAI 9.6 10/08/2023    SHAI 9.3 01/29/2021      Lab Results   Component Value Date    AST 28 06/03/2023    AST 24 01/29/2021    ALT 33 06/03/2023    ALT 34 01/29/2021       No results found for: \"A1C\"    No results found for: \"INR\"         Problem List     Patient Active Problem List   Diagnosis    Atrial fibrillation with RVR (H)    PAF (paroxysmal atrial fibrillation) (H)            Medications     Current Outpatient Medications   Medication Sig Dispense Refill    acetaminophen (TYLENOL) 325 MG tablet Take 2 tablets (650 mg) by mouth every 6 hours as needed for mild pain or other (and adjunct with moderate or severe pain or per patient request)      apixaban ANTICOAGULANT (ELIQUIS ANTICOAGULANT) 5 MG tablet Take 1 tablet (5 mg) by mouth 2 times daily 60 tablet 1    cyclobenzaprine (FLEXERIL) 5 MG tablet Take 1-2 tablets (5-10 mg) by mouth " 3 times daily as needed for muscle spasms 20 tablet 0    glucosamine 500 MG CAPS capsule Take 500 mg by mouth daily      metoprolol succinate ER (TOPROL XL) 25 MG 24 hr tablet Take 1 tablet (25 mg) by mouth daily 30 tablet 2            Past Medical History     Past Medical History:   Diagnosis Date    Paroxysmal atrial fibrillation      Past Surgical History:   Procedure Laterality Date    APPENDECTOMY  2008?    EP ABLATION FOCAL AFIB N/A 7/31/2023    Procedure: Ablation Atrial Fibrilation;  Surgeon: William Domingo MD;  Location:  HEART CARDIAC CATH LAB     Family History   Problem Relation Age of Onset    Hypertension Mother     Hypertension Father     Factor V Leiden deficiency Brother     Factor V Leiden deficiency Paternal Half-Sister      Social History     Socioeconomic History    Marital status:      Spouse name: Not on file    Number of children: Not on file    Years of education: Not on file    Highest education level: Not on file   Occupational History    Not on file   Tobacco Use    Smoking status: Never    Smokeless tobacco: Never   Vaping Use    Vaping Use: Never used   Substance and Sexual Activity    Alcohol use: Yes     Comment: 0-3-4 per day    Drug use: Never    Sexual activity: Yes     Partners: Female     Birth control/protection: Condom   Other Topics Concern    Parent/sibling w/ CABG, MI or angioplasty before 65F 55M? No   Social History Narrative    Not on file     Social Determinants of Health     Financial Resource Strain: Low Risk  (10/3/2023)    Financial Resource Strain     Within the past 12 months, have you or your family members you live with been unable to get utilities (heat, electricity) when it was really needed?: No   Food Insecurity: Low Risk  (10/3/2023)    Food Insecurity     Within the past 12 months, did you worry that your food would run out before you got money to buy more?: No     Within the past 12 months, did the food you bought just not last and you didn t  have money to get more?: No   Transportation Needs: Low Risk  (10/3/2023)    Transportation Needs     Within the past 12 months, has lack of transportation kept you from medical appointments, getting your medicines, non-medical meetings or appointments, work, or from getting things that you need?: No   Physical Activity: Not on file   Stress: Not on file   Social Connections: Not on file   Interpersonal Safety: Not on file   Housing Stability: Low Risk  (10/3/2023)    Housing Stability     Do you have housing? : Yes     Are you worried about losing your housing?: No            Allergies   Bee venom and Blood transfusion related (informational only)    Today's clinic visit entailed:  Review of the result(s) of each unique test - EKG's  Prescription drug management  I spent a total of 30 minutes on the day of the visit.   Time spent by me doing chart review, history and exam, documentation and further activities per the note  Provider  Link to [facility] Help Grid     The level of medical decision making during this visit was of moderate complexity.      Thank you for allowing me to participate in the care of your patient.      Sincerely,     Meli Lerma PA-C     [facility]  cc:   No referring provider defined for this encounter.

## 2023-11-01 NOTE — PATIENT INSTRUCTIONS
Today's Plan:   Continue metoprolol, at least for the next few months.   Minimize alcohol, drink a glass of water between drinks.   Continue Eliquis for another week, then stop.   See Dr. Domingo in ~ 3 months.    If you have questions or concerns please call my nurse team at [phone].   Scheduling phone number: [phone]  For after hours urgent concerns call [phone] option 2.   Reminder: Please bring in all current medications, over the counter supplements and vitamin bottles to your next appointment.    It was a pleasure seeing you today!     Meli Lerma PA-C

## 2023-11-01 NOTE — PROGRESS NOTES
"  Electrophysiology Clinic Progress Note    Vladimir Gallego MRN# [id number]   YOB: 1985 Age: 38 year old     Primary cardiology team: Dr. Rosa (general), Dr. Domingo (EP)         Assessment and Plan     In summary, Vladimir Gallego presents today for follow up after an admission for rapid symptomatic RF requiring diltiazem gtt with subsequent spontaneous conversion. Episode likely triggered by ETOH use (although this was minimal) and non-compliance with metoprolol. Since he was still in the 3 month \"healing window,\" we will continue to monitor at this time. Encouraged compliance with metoprolol for now (can consider stopping this in follow-up) and continued ETOH in moderation. He will continue Eliquis for another week, then stop it for a [id number] score of 0.     Follow-up:  With Dr. Domingo in 3 months.     SOUMYA Diaz [facility] - [facility] Clinic         History of Presenting Illness     Vladimir Gallego is a pleasant 38 year old patient who is a marathon runner, and was doing quite well until this past December when he presented with symptomatic AF with RVR after having a few drinks required cardioversion.  Since then he had additional cardioversions with the last one being 6/2/23.  He had a few drinks both times. Patient is known to have mildly enlarged left atrium but has preserved biventricular function. He has no family history of atrial fibrillation in his parents or siblings. He works in construction.    When he saw Dr. Domingo in clinic July 2023, AF ablation was recommended. This took place on 7/31/23. He had successful wide circumferential PV isolation and empiric CTI ablation.     He did well after this, until he presented to the ED 10/8/23 with recurrent rapid symptomatic AF requiring diltiazem gtt with subsequent spontaneous conversion. He'd had 1.5 drinks of alcohol prior to the episode. He also had stopped taking his metoprolol. He was advised to re-start it.     Today, Vladimir" "returns to clinic stating he's feeling better overall. No AF episodes since his hospitalization. Patient denies shortness of breath, PND, orthopnea, edema, claudication, palpitations, near syncope or syncope. Exercising without issue. EKG today shows NSR @ 82 bpm.          Review of Systems     12-pt ROS is negative except for as noted in the HPI.          Physical Exam     Vitals: /80 (BP Location: Right arm, Patient Position: Sitting, Cuff Size: Adult Regular)   Pulse 85   Ht 1.702 m (5' 7\")   Wt 82.6 kg (182 lb)   SpO2 96%   BMI 28.51 kg/m    Wt Readings from Last 10 Encounters:   11/01/23 82.6 kg (182 lb)   10/09/23 83.4 kg (183 lb 14.4 oz)   08/08/23 82.5 kg (181 lb 14.4 oz)   08/08/23 85.3 kg (188 lb)   07/31/23 81.6 kg (180 lb)   07/07/23 80.3 kg (177 lb)   06/17/23 79.2 kg (174 lb 9.7 oz)   06/03/23 77.1 kg (170 lb)   05/24/23 77.7 kg (171 lb 6.4 oz)   05/22/23 77.1 kg (170 lb)       Constitutional:  Patient is pleasant, alert, cooperative, and in NAD.  HEENT:  NCAT. PERRLA. EOM's intact.   Neck:  CVP appears normal. No carotid bruits.   Pulmonary: Normal respiratory effort. CTAB.   Cardiac: RRR, normal S1/S2, no S3/S4, no murmur or rub.   Abdomen:  Non-tender abdomen, no hepatosplenomegaly appreciated.   Vascular: Pulses in the upper and lower extremities are 2+ and equal bilaterally. R FA access site closed, with no hematoma, erythema, or tenderness. Expected ecchymosis surrounding.   Extremities: No edema, erythema, cyanosis or tenderness appreciated.  Skin:  No rashes or lesions appreciated.   Neurological:  No gross motor or sensory deficits.   Psych: Appropriate affect.          Data   Labs reviewed:  Recent Labs   Lab Test 10/08/23  2216 06/03/23  0128 12/16/22  1527   LDL  --   --  126*   HDL  --   --  49   NHDL  --   --  146*   CHOL  --   --  195   TRIG  --   --  102   TSH 2.01 4.21* 1.65       Lab Results   Component Value Date    WBC 10.4 10/08/2023    WBC 5.6 01/29/2021    RBC 5.55 " "10/08/2023    RBC 5.12 01/29/2021    HGB 16.9 10/08/2023    HGB 15.4 01/29/2021    HCT 48.3 10/08/2023    HCT 45.2 01/29/2021    MCV 87 10/08/2023    MCV 88 01/29/2021    MCH 30.5 10/08/2023    MCH 30.1 01/29/2021    MCHC 35.0 10/08/2023    MCHC 34.1 01/29/2021    RDW 11.5 10/08/2023    RDW 11.5 01/29/2021     10/08/2023     01/29/2021       Lab Results   Component Value Date     10/08/2023     01/29/2021    POTASSIUM 3.9 10/08/2023    POTASSIUM 3.8 01/29/2021    CHLORIDE 104 10/08/2023    CHLORIDE 107 01/29/2021    CO2 20 (L) 10/08/2023    CO2 28 01/29/2021    ANIONGAP 19 (H) 10/08/2023    ANIONGAP 4 01/29/2021     (H) 10/09/2023     (H) 10/08/2023    GLC 83 01/29/2021    BUN 17.7 10/08/2023    BUN 13 01/29/2021    CR 0.85 10/08/2023    CR 0.95 01/29/2021    GFRESTIMATED >90 10/08/2023    GFRESTIMATED >90 01/29/2021    GFRESTBLACK >90 01/29/2021    SHAI 9.6 10/08/2023    SHAI 9.3 01/29/2021      Lab Results   Component Value Date    AST 28 06/03/2023    AST 24 01/29/2021    ALT 33 06/03/2023    ALT 34 01/29/2021       No results found for: \"A1C\"    No results found for: \"INR\"         Problem List     Patient Active Problem List   Diagnosis    Atrial fibrillation with RVR (H)    PAF (paroxysmal atrial fibrillation) (H)            Medications     Current Outpatient Medications   Medication Sig Dispense Refill    acetaminophen (TYLENOL) 325 MG tablet Take 2 tablets (650 mg) by mouth every 6 hours as needed for mild pain or other (and adjunct with moderate or severe pain or per patient request)      apixaban ANTICOAGULANT (ELIQUIS ANTICOAGULANT) 5 MG tablet Take 1 tablet (5 mg) by mouth 2 times daily 60 tablet 1    cyclobenzaprine (FLEXERIL) 5 MG tablet Take 1-2 tablets (5-10 mg) by mouth 3 times daily as needed for muscle spasms 20 tablet 0    glucosamine 500 MG CAPS capsule Take 500 mg by mouth daily      metoprolol succinate ER (TOPROL XL) 25 MG 24 hr tablet Take 1 tablet (25 " mg) by mouth daily 30 tablet 2            Past Medical History     Past Medical History:   Diagnosis Date    Paroxysmal atrial fibrillation      Past Surgical History:   Procedure Laterality Date    APPENDECTOMY  2008?    EP ABLATION FOCAL AFIB N/A 7/31/2023    Procedure: Ablation Atrial Fibrilation;  Surgeon: William Domingo MD;  Location:  HE[facility] CATH LAB     Family History   Problem Relation Age of Onset    Hypertension Mother     Hypertension Father     Factor V Leiden deficiency Brother     Factor V Leiden deficiency Paternal Half-Sister      Social History     Socioeconomic History    Marital status:      Spouse name: Not on file    Number of children: Not on file    Years of education: Not on file    Highest education level: Not on file   Occupational History    Not on file   Tobacco Use    Smoking status: Never    Smokeless tobacco: Never   Vaping Use    Vaping Use: Never used   Substance and Sexual Activity    Alcohol use: Yes     Comment: 0-3-4 per day    Drug use: Never    Sexual activity: Yes     Partners: Female     Birth control/protection: Condom   Other Topics Concern    Parent/sibling w/ CABG, MI or angioplasty before 65F 55M? No   Social History Narrative    Not on file     Social Determinants of Health     Financial Resource Strain: Low Risk  (10/3/2023)    Financial Resource Strain     Within the past 12 months, have you or your family members you live with been unable to get utilities (heat, electricity) when it was really needed?: No   Food Insecurity: Low Risk  (10/3/2023)    Food Insecurity     Within the past 12 months, did you worry that your food would run out before you got money to buy more?: No     Within the past 12 months, did the food you bought just not last and you didn t have money to get more?: No   Transportation Needs: Low Risk  (10/3/2023)    Transportation Needs     Within the past 12 months, has lack of transportation kept you from medical appointments,  getting your medicines, non-medical meetings or appointments, work, or from getting things that you need?: No   Physical Activity: Not on file   Stress: Not on file   Social Connections: Not on file   Interpersonal Safety: Not on file   Housing Stability: Low Risk  (10/3/2023)    Housing Stability     Do you have housing? : Yes     Are you worried about losing your housing?: No            Allergies   Bee venom and Blood transfusion related (informational only)    Today's clinic visit entailed:  Review of the result(s) of each unique test - EKG's  Prescription drug management  I spent a total of 30 minutes on the day of the visit.   Time spent by me doing chart review, history and exam, documentation and further activities per the note  Provider  Link to MDM Help Grid     The level of medical decision making during this visit was of moderate complexity.

## 2023-12-12 ENCOUNTER — NURSE TRIAGE (OUTPATIENT)
Dept: NURSING | Facility: CLINIC | Age: 38
End: 2023-12-12
Payer: COMMERCIAL

## 2023-12-12 ENCOUNTER — APPOINTMENT (OUTPATIENT)
Dept: GENERAL RADIOLOGY | Facility: CLINIC | Age: 38
End: 2023-12-12
Attending: PHYSICIAN ASSISTANT
Payer: COMMERCIAL

## 2023-12-12 ENCOUNTER — HOSPITAL ENCOUNTER (EMERGENCY)
Facility: CLINIC | Age: 38
Discharge: HOME | End: 2023-12-12
Attending: PHYSICIAN ASSISTANT | Admitting: PHYSICIAN ASSISTANT
Payer: COMMERCIAL

## 2023-12-12 VITALS
RESPIRATION RATE: 18 BRPM | DIASTOLIC BLOOD PRESSURE: 92 MMHG | TEMPERATURE: 97.4 F | BODY MASS INDEX: 28.19 KG/M2 | WEIGHT: 180 LBS | OXYGEN SATURATION: 98 % | SYSTOLIC BLOOD PRESSURE: 135 MMHG | HEART RATE: 77 BPM

## 2023-12-12 DIAGNOSIS — R07.9 CHEST PAIN, UNSPECIFIED TYPE: ICD-10-CM

## 2023-12-12 LAB
ANION GAP SERPL CALCULATED.3IONS-SCNC: 12 MMOL/L (ref 7–15)
ATRIAL RATE - MUSE: 82 BPM
BASOPHILS # BLD AUTO: 0 10E3/UL (ref 0–0.2)
BASOPHILS NFR BLD AUTO: 1 %
BUN SERPL-MCNC: 25 MG/DL (ref 6–20)
CALCIUM SERPL-MCNC: 9.7 MG/DL (ref 8.6–10)
CHLORIDE SERPL-SCNC: 102 MMOL/L (ref 98–107)
CREAT SERPL-MCNC: 0.94 MG/DL (ref 0.67–1.17)
D DIMER PPP FEU-MCNC: <0.27 UG/ML FEU (ref 0–0.5)
DEPRECATED HCO3 PLAS-SCNC: 25 MMOL/L (ref 22–29)
DIASTOLIC BLOOD PRESSURE - MUSE: NORMAL MMHG
EGFRCR SERPLBLD CKD-EPI 2021: >90 ML/MIN/1.73M2
EOSINOPHIL # BLD AUTO: 0.2 10E3/UL (ref 0–0.7)
EOSINOPHIL NFR BLD AUTO: 4 %
ERYTHROCYTE [DISTWIDTH] IN BLOOD BY AUTOMATED COUNT: 11.8 % (ref 10–15)
GLUCOSE SERPL-MCNC: 92 MG/DL (ref 70–99)
HCT VFR BLD AUTO: 45.9 % (ref 40–53)
HGB BLD-MCNC: 16 G/DL (ref 13.3–17.7)
HOLD SPECIMEN: NORMAL
IMM GRANULOCYTES # BLD: 0 10E3/UL
IMM GRANULOCYTES NFR BLD: 1 %
INTERPRETATION ECG - MUSE: NORMAL
LYMPHOCYTES # BLD AUTO: 1.8 10E3/UL (ref 0.8–5.3)
LYMPHOCYTES NFR BLD AUTO: 31 %
MCH RBC QN AUTO: 30.5 PG (ref 26.5–33)
MCHC RBC AUTO-ENTMCNC: 34.9 G/DL (ref 31.5–36.5)
MCV RBC AUTO: 87 FL (ref 78–100)
MONOCYTES # BLD AUTO: 0.6 10E3/UL (ref 0–1.3)
MONOCYTES NFR BLD AUTO: 10 %
NEUTROPHILS # BLD AUTO: 3.3 10E3/UL (ref 1.6–8.3)
NEUTROPHILS NFR BLD AUTO: 53 %
NRBC # BLD AUTO: 0 10E3/UL
NRBC BLD AUTO-RTO: 0 /100
P AXIS - MUSE: 39 DEGREES
PLATELET # BLD AUTO: 263 10E3/UL (ref 150–450)
POTASSIUM SERPL-SCNC: 3.8 MMOL/L (ref 3.4–5.3)
PR INTERVAL - MUSE: 134 MS
QRS DURATION - MUSE: 94 MS
QT - MUSE: 358 MS
QTC - MUSE: 418 MS
R AXIS - MUSE: 30 DEGREES
RBC # BLD AUTO: 5.25 10E6/UL (ref 4.4–5.9)
SODIUM SERPL-SCNC: 139 MMOL/L (ref 135–145)
SYSTOLIC BLOOD PRESSURE - MUSE: NORMAL MMHG
T AXIS - MUSE: 47 DEGREES
TROPONIN T SERPL HS-MCNC: <6 NG/L
VENTRICULAR RATE- MUSE: 82 BPM
WBC # BLD AUTO: 6 10E3/UL (ref 4–11)

## 2023-12-12 PROCEDURE — 85025 COMPLETE CBC W/AUTO DIFF WBC: CPT | Performed by: EMERGENCY MEDICINE

## 2023-12-12 PROCEDURE — 85025 COMPLETE CBC W/AUTO DIFF WBC: CPT | Performed by: PHYSICIAN ASSISTANT

## 2023-12-12 PROCEDURE — 36415 COLL VENOUS BLD VENIPUNCTURE: CPT | Performed by: EMERGENCY MEDICINE

## 2023-12-12 PROCEDURE — 93005 ELECTROCARDIOGRAM TRACING: CPT

## 2023-12-12 PROCEDURE — 80048 BASIC METABOLIC PNL TOTAL CA: CPT | Performed by: PHYSICIAN ASSISTANT

## 2023-12-12 PROCEDURE — 71046 X-RAY EXAM CHEST 2 VIEWS: CPT

## 2023-12-12 PROCEDURE — 84484 ASSAY OF TROPONIN QUANT: CPT | Performed by: PHYSICIAN ASSISTANT

## 2023-12-12 PROCEDURE — 85379 FIBRIN DEGRADATION QUANT: CPT | Performed by: PHYSICIAN ASSISTANT

## 2023-12-12 PROCEDURE — 80048 BASIC METABOLIC PNL TOTAL CA: CPT | Performed by: EMERGENCY MEDICINE

## 2023-12-12 PROCEDURE — 84484 ASSAY OF TROPONIN QUANT: CPT | Performed by: EMERGENCY MEDICINE

## 2023-12-12 PROCEDURE — 99285 EMERGENCY DEPT VISIT HI MDM: CPT | Mod: 25

## 2023-12-12 PROCEDURE — 250N000013 HC RX MED GY IP 250 OP 250 PS 637: Performed by: PHYSICIAN ASSISTANT

## 2023-12-12 RX ORDER — MAGNESIUM HYDROXIDE/ALUMINUM HYDROXICE/SIMETHICONE 120; 1200; 1200 MG/30ML; MG/30ML; MG/30ML
15 SUSPENSION ORAL ONCE
Status: COMPLETED | OUTPATIENT
Start: 2023-12-12 | End: 2023-12-12

## 2023-12-12 RX ADMIN — ALUMINUM HYDROXIDE, MAGNESIUM HYDROXIDE, AND SIMETHICONE 15 ML: 200; 200; 20 SUSPENSION ORAL at 21:28

## 2023-12-12 ASSESSMENT — ACTIVITIES OF DAILY LIVING (ADL): ADLS_ACUITY_SCORE: 33

## 2023-12-12 NOTE — TELEPHONE ENCOUNTER
"Vladimir reports ongoing Chest Pain - \"Discomfort\"    - Noted when waking from a nap  - Hx of A-fib and prior Ablation  - BP = 146/111    911 advised  Pt states that he will have his wife take him to [facility] instead of EMS    Dafne Darling RN  [facility]      Reason for Disposition   Chest pain lasting longer than 5 minutes and ANY of the following:    history of heart disease  (i.e., heart attack, bypass surgery, angina, angioplasty, CHF; not just a heart murmur)    described as crushing, pressure-like, or heavy    age > 50    age > 30 AND at least one cardiac risk factor (i.e., hypertension, diabetes, obesity, smoker or strong family history of heart disease)    not relieved with nitroglycerin    Additional Information   Negative: SEVERE difficulty breathing (e.g., struggling for each breath, speaks in single words)   Negative: Difficult to awaken or acting confused (e.g., disoriented, slurred speech)   Negative: Shock suspected (e.g., cold/pale/clammy skin, too weak to stand, low BP, rapid pulse)   Negative: Passed out (i.e., lost consciousness, collapsed and was not responding)    Protocols used: Chest Pain-A-AH    "

## 2023-12-13 NOTE — ED TRIAGE NOTES
1600 started having CP and SOB, BP was 146/111, lasted about 10-15 min. Now has minimal pain. Hx of Afib. Ablasion in July, episode in October. On Metoprolol. Not on blood thinner. Pt did check his watch at the time and was not alerted to Afib.

## 2023-12-13 NOTE — ED PROVIDER NOTES
History     Chief Complaint:  Chest Pain       HPI   Vladimir Gallego is a 38 year old male with a past medical history of A-fib status post ablation 7/31/2023 who presents with acute onset of chest pain around 5 PM.  Patient reports he was lying in bed woke up with a sharp chest pain in the center of his chest which partially felt similar to potential heartburn he said in the past but also new.  It did not radiate to his shoulders neck or back.  He reported he felt slightly short of breath when this was occurring but this subsided after 15 minutes.  He had a repeat bout of pain when he got here in the waiting area but this also resolved.  The pain is not provoked by exertion, position, or palpation.  He denies any trauma to his chest.  No history of MI, family history of MI, smoking, diabetes, hypertension, hyperlipidemia.  On recent CTA in July following A-fib it was noted he had mild coronary artery calcification.  Patient is on Lopressor at this time given his history of A-fib but this may stop at his next cardiology follow-up.  He has stopped using Eliquis post his procedure given his [id number] score of 0.  Patient has no history of blood clot, hormone use, trauma, recent hospitalization, recent surgery, hemoptysis, cough, fevers.  No active cancer.  Denies any leg pain or swelling.  He presents with his wife.  Patient reports that he ate some chips while he was in the triage area to not provoke his pain.  Currently has no pain.  He denies feeling any palpitations similar to his A-fib in the past.  No syncope.  No family history of aortic dissection.  Patient does have a history of gastritis.  Denies any abdominal pain nausea vomiting.  No upper or lower extremity numbness or weakness.  Please see recent CT angiogram and echocardiogram studies in this last year.      Procedure: CTA ANGIOGRAM HEART   Examination Date: 7/17/2023 2:25 PM      Indication:  Atrial Fibrillation     Clinical Information: Atrial  fibrillation with rapid ventricular  response (H)      Ordering Provider: ISMA Domingo MD     Overall quality of the study: Adequate.      PROCEDURE: ECG gated multi-slice computed tomography of the heart   with and without intravenous contrast  ( Isovue 370, 80 cc, wasted 0  cc) was  performed on a Siemens Dual Source Flash scanner without  incident. CTA was performed in the flash mode at a heart rate of 57  bpm with 100 kVp. Images were reconstructed and analyzed on a  [employer] workstation. Scan protocol was optimized to minimize  radiation exposure. The total radiation exposure was calculated to be  66 DLP, and 2.09 mSv.     FINDINGS:      1. Normal pulmonary venous anatomy with all pulmonary veins draining  into the left atrium.     2.  Orthogonal measurements of pulmonary vein ostia / pulmonary vein  measurements are as follows:      Right upper pulmonary vein measures 17.4 x 16 mm.     Right lower pulmonary vein measures 20.6 x 19.9 mm.     Left upper pulmonary vein measures 17.5 x 15.2 mm.      Left lower pulmonary vein measures  14.8 x 11.9 mm.     3. Windsock type left atrial appendage, no thrombus.      4. Significant left atrial enlargement.     5.  Mild coronary calcification.     6.   Please review Radiology report for incidental noncardiac findings  that  will follow separately.          Complete Portable Echo Adult. Optison (NDC #1683-9009) given intravenously. 10/09/23  Interpretation Summary     The visual ejection fraction is 55-60%.  The right ventricle is normal in size and function.  The inferior vena cava was normal in size with preserved respiratory  variability.  There is no pericardial effusion.  ______________________________________________________________________________  Left Ventricle  The left ventricle is normal in structure, function and size. The visual  ejection fraction is 55-60%. Diastolic Doppler findings (E/E' ratio and/or  other parameters) suggest left ventricular filling  pressures are normal.     Right Ventricle  The right ventricle is normal in size and function.     Atria  Normal left atrial size. Borderline right atrial enlargement.     Mitral Valve  The mitral valve is normal in structure and function. There is trace mitral  regurgitation. There is no mitral valve stenosis.     Tricuspid Valve  The tricuspid valve is not well visualized, but is grossly normal. Right  ventricular systolic pressure could not be approximated due to inadequate  tricuspid regurgitation.     Aortic Valve  The aortic valve is normal in structure and function.     Pulmonic Valve  The pulmonic valve is not well visualized.     Vessels  The aortic root is normal size. The inferior vena cava was normal in size with  preserved respiratory variability.     Pericardium  There is no pericardial effusion.           Independent Historian:        Review of External Notes:  Cardiology note-11-1-2023    Medications:    omeprazole (PRILOSEC) 20 MG DR capsule  acetaminophen (TYLENOL) 325 MG tablet  apixaban ANTICOAGULANT (ELIQUIS ANTICOAGULANT) 5 MG tablet  cyclobenzaprine (FLEXERIL) 5 MG tablet  glucosamine 500 MG CAPS capsule  metoprolol succinate ER (TOPROL XL) 25 MG 24 hr tablet        Past Medical History:    Past Medical History:   Diagnosis Date    Paroxysmal atrial fibrillation        Past Surgical History:    Past Surgical History:   Procedure Laterality Date    APPENDECTOMY  2008?    EP ABLATION FOCAL AFIB N/A 7/31/2023    Procedure: Ablation Atrial Fibrilation;  Surgeon: William Domingo MD;  Location:  [facility]          Physical Exam   Patient Vitals for the past 24 hrs:   BP Temp Temp src Pulse Resp SpO2 Weight   12/12/23 1923 (!) 135/92 97.4  F (36.3  C) Temporal 77 18 98 % 81.6 kg (180 lb)        Physical Exam  Vitals and nursing note reviewed.   Constitutional:       Appearance: Normal appearance. He is not diaphoretic.   HENT:      Mouth/Throat:      Mouth: Mucous membranes are moist.       Pharynx: Oropharynx is clear.   Eyes:      General: No scleral icterus.     Conjunctiva/sclera: Conjunctivae normal.   Cardiovascular:      Rate and Rhythm: Normal rate and regular rhythm.      Pulses:           Radial pulses are 2+ on the right side and 2+ on the left side.      Heart sounds: Normal heart sounds. No murmur heard.     No friction rub. No gallop.   Pulmonary:      Effort: No respiratory distress.      Breath sounds: No stridor. No wheezing, rhonchi or rales.   Chest:      Chest wall: No tenderness.   Abdominal:      General: There is no distension.      Tenderness: There is no abdominal tenderness. There is no guarding or rebound.   Musculoskeletal:         General: No swelling or tenderness.      Right lower leg: No edema.      Left lower leg: No edema.   Skin:     Capillary Refill: Capillary refill takes less than 2 seconds.      Coloration: Skin is not pale.   Neurological:      Mental Status: He is alert and oriented to person, place, and time. Mental status is at baseline.      Sensory: No sensory deficit.      Motor: No weakness.   Psychiatric:         Mood and Affect: Mood normal.         Behavior: Behavior normal.         Thought Content: Thought content normal.         Emergency Department Course   ECG  ECG taken at 1742, ECG read at 1747  Normal sinus rhythm  Normal ECG  Negative STEMI  Rate 82 bpm. MA interval 134 ms. QRS duration 94 ms. QT/QTc 358/418 ms. P-R-T axes 39 30 47.  Read by Dr.Chad Trierweiler      Imaging:  Chest XR,  PA & LAT   Final Result   IMPRESSION:       Heart size is normal. Lungs are clear bilaterally. Mediastinum and visualized bony structures are unremarkable.        Report per radiology    Laboratory:  Labs Ordered and Resulted from Time of ED Arrival to Time of ED Departure   BASIC METABOLIC PANEL - Abnormal       Result Value    Sodium 139      Potassium 3.8      Chloride 102      Carbon Dioxide (CO2) 25      Anion Gap 12      Urea Nitrogen 25.0 (*)      "Creatinine 0.94      GFR Estimate >90      Calcium 9.7      Glucose 92     TROPONIN T, HIGH SENSITIVITY - Normal    Troponin T, High Sensitivity <6     D DIMER QUANTITATIVE - Normal    D-Dimer Quantitative <0.27     CBC WITH PLATELETS AND DIFFERENTIAL    WBC Count 6.0      RBC Count 5.25      Hemoglobin 16.0      Hematocrit 45.9      MCV 87      MCH 30.5      MCHC 34.9      RDW 11.8      Platelet Count 263      % Neutrophils 53      % Lymphocytes 31      % Monocytes 10      % Eosinophils 4      % Basophils 1      % Immature Granulocytes 1      NRBCs per 100 WBC 0      Absolute Neutrophils 3.3      Absolute Lymphocytes 1.8      Absolute Monocytes 0.6      Absolute Eosinophils 0.2      Absolute Basophils 0.0      Absolute Immature Granulocytes 0.0      Absolute NRBCs 0.0          Procedures       Emergency Department Course & Assessments:             Interventions:  Medications   alum & mag hydroxide-simethicone (MAALOX) suspension 15 mL (15 mLs Oral $Given 12/12/23 2128)          Independent Interpretation (X-rays, CTs, rhythm strip):  CXR: No pneumothorax, effusion, infiltrate    Consultations/Discussion of Management or Tests:    ED Course as of 12/13/23 1154   Tue Dec 12, 2023   2051 HEART Score for Major Cardiac Events from [employer]  on 12/12/2023  ** All calculations should be rechecked by clinician prior to use **    RESULT SUMMARY:  1 points  Low Score (0-3 points)    Risk of MACE of 0.9-1.7%.      INPUTS:  History -> 0 = Slightly suspicious  EKG -> 0 = Normal  Age -> 0 = <45  Risk factors -> 1 = 1-2 risk factors  Initial troponin -> 0 = =normal limit     2052 Wells' Criteria for Pulmonary Embolism from [employer]  on 12/13/2023  ** All calculations should be rechecked by clinician prior to use **    RESULT SUMMARY:  0.0 points  Low risk group: 1.3% chance of PE in an ED population.     Another study assigned scores = 4 as \"PE Unlikely\" and had a 3% incidence of PE.      INPUTS:  Clinical signs and symptoms " of DVT -> 0 = No  PE is #1 diagnosis OR equally likely -> 0 = No  Heart rate > 100 -> 0 = No  Immobilization at least 3 days OR surgery in the previous 4 weeks -> 0 = No  Previous, objectively diagnosed PE or DVT -> 0 = No  Hemoptysis -> 0 = No  Malignancy w/ treatment within 6 months or palliative -> 0 = No     2148 Discussed results.  Patient continues to be symptom-free.  Patient we discharged with close primary care follow-up.       Social Determinants of Health affecting care:       Disposition:  The patient was discharged to home.     Impression & Plan    CMS Diagnoses: None        Medical Decision Making:  This is a 38-year-old male who presents with acute onset of chest pain.  Broad differential was considered including but not limited to ACS, aortic dissection, PE, pneumonia, pneumothorax, gastritis, pericarditis, myocarditis, among many others.  At this time based on the heart score pathway and guidelines here at our emergency department EKG testing was negative for ischemic changes, heart score is 1 and troponin undetectable making ACS very unlikely.  Symptoms are not consistent or concerning for aortic dissection.  D-dimer was negative in the setting of low Wells criteria making PE unlikely.  Chest x-ray obtained showing no evidence of pneumonia or pneumothorax.  Electrolytes and labs within normal limits.  Patient's vitals are normal.  Patient's symptoms were resolved by the time I evaluated the patient.  He does have a history of gastritis and GERD partially suspect that this may be the cause.  Recommend trial of omeprazole and acute primary care follow-up in the next 2 to 3 days for reevaluation.  At this time low clinical concern for angina or unstable angina.  Symptoms are not exertional.  At this time I feel the patient safe to discharge home with close primary care or cardiology follow-up.  Return precautions discussed including worsening symptoms or new concerning signs or symptoms or worsening  condition.        Diagnosis:    ICD-10-CM    1. Chest pain, unspecified type  R07.9            Discharge Medications:  Discharge Medication List as of 12/12/2023  9:48 PM        START taking these medications    Details   omeprazole (PRILOSEC) 20 MG DR capsule Take 1 capsule (20 mg) by mouth daily for 14 days, Disp-14 capsule, R-0, Local Print                12/12/2023   Chris Esparza, Chris Das PA-C  12/13/23 1154

## 2024-03-02 ENCOUNTER — HEALTH MAINTENANCE LETTER (OUTPATIENT)
Age: 39
End: 2024-03-02

## 2024-03-04 ENCOUNTER — E-VISIT (OUTPATIENT)
Dept: FAMILY MEDICINE | Facility: CLINIC | Age: 39
End: 2024-03-04
Payer: COMMERCIAL

## 2024-03-04 DIAGNOSIS — Z63.0 MARITAL CONFLICT: Primary | ICD-10-CM

## 2024-03-04 PROCEDURE — 99421 OL DIG E/M SVC 5-10 MIN: CPT | Performed by: NURSE PRACTITIONER

## 2024-03-04 SDOH — SOCIAL STABILITY - SOCIAL INSECURITY: PROBLEMS IN RELATIONSHIP WITH SPOUSE OR PARTNER: Z63.0

## 2024-03-04 ASSESSMENT — PATIENT HEALTH QUESTIONNAIRE - PHQ9
10. IF YOU CHECKED OFF ANY PROBLEMS, HOW DIFFICULT HAVE THESE PROBLEMS MADE IT FOR YOU TO DO YOUR WORK, TAKE CARE OF THINGS AT HOME, OR GET ALONG WITH OTHER PEOPLE: SOMEWHAT DIFFICULT
SUM OF ALL RESPONSES TO PHQ QUESTIONS 1-9: 8
SUM OF ALL RESPONSES TO PHQ QUESTIONS 1-9: 8

## 2024-03-04 NOTE — PATIENT INSTRUCTIONS
Vladimir,     Sorry to hear this.  I am happy to write a letter for you.  Please let me know the type size and age of dog you have.  I am assuming you are wanting a letter for your housing to allow your dog to be there?  Please let me know specifics so I can get your letter done.    Also I see this information below in your e-visit. Are you having dizziness?  With your history of A fib I just wanted to be sure you are not needing to be seen in person.    Requested Problem Additions Date Noted Reported By  Comments   Dizzy spells 9/11/2023 Vladimir Gallego I ve been getting these dizzy spells with my heart racing occasionally. However I had two today. Is it a concern?           Healthy regards,            Claudia Manuel, FNP-BC

## 2024-03-04 NOTE — TELEPHONE ENCOUNTER
Provider E-Visit time total (minutes): 5 minutes.       Healthy regards,            Claudia Manuel, FNP-BC

## 2024-03-04 NOTE — LETTER
March 5, 2024      Vladimir Gallego  [address] [place] [address]        To Whom It May Concern:    Vladimir Gallego is under my medical care.  It is in my medical expert opinion that his dog Rashaun be allowed to reside with him in his home. Rashaun offers much needed emotional support which is felt necessary for his medical condition.         Sincerely,         DEION Palomo CNP

## 2024-03-05 ASSESSMENT — PATIENT HEALTH QUESTIONNAIRE - PHQ9: SUM OF ALL RESPONSES TO PHQ QUESTIONS 1-9: 8

## 2024-03-06 ENCOUNTER — OFFICE VISIT (OUTPATIENT)
Dept: CARDIOLOGY | Facility: CLINIC | Age: 39
End: 2024-03-06
Attending: PHYSICIAN ASSISTANT
Payer: COMMERCIAL

## 2024-03-06 VITALS
OXYGEN SATURATION: 96 % | BODY MASS INDEX: 27.86 KG/M2 | DIASTOLIC BLOOD PRESSURE: 77 MMHG | HEART RATE: 82 BPM | WEIGHT: 177.5 LBS | SYSTOLIC BLOOD PRESSURE: 122 MMHG | HEIGHT: 67 IN

## 2024-03-06 DIAGNOSIS — I48.0 PAF (PAROXYSMAL ATRIAL FIBRILLATION) (H): ICD-10-CM

## 2024-03-06 PROCEDURE — 99214 OFFICE O/P EST MOD 30 MIN: CPT | Performed by: INTERNAL MEDICINE

## 2024-03-06 NOTE — LETTER
"3/6/2024    Physician No Ref-Primary  No address on file    RE: Vladimir Gallego       Dear Colleague,     I had the pleasure of seeing Vladimir Gallego in the [facility].    Electrophysiology Clinic Progress Note    Vladimir Gallego MRN# [id number]   YOB: 1985 Age: 38 year old     Primary cardiologist:          Assessment and Plan   Delightful pt with symptomatic pAF who underwent PVI and empiric ablation of CTI last July as a first line therapy. Pt had AF few months later but converted spontaneously. No AF since. Was in ED a few months ago for cp which was thought from GERD. Reassured pt that AF can recur in 3 months post AF blanking period. The longer out from ablation AF free the better the prognosis for long term outcome. May consider repeat ablation if AF recurs. See me in a year.             Review of Systems     12-pt ROS is negative except for as noted in the HPI.          Physical Exam     Vitals: /77   Pulse 82   Ht 1.702 m (5' 7\")   Wt 80.5 kg (177 lb 8 oz)   SpO2 96%   BMI 27.80 kg/m    Wt Readings from Last 10 Encounters:   03/06/24 80.5 kg (177 lb 8 oz)   12/12/23 81.6 kg (180 lb)   11/01/23 82.6 kg (182 lb)   10/09/23 83.4 kg (183 lb 14.4 oz)   08/08/23 82.5 kg (181 lb 14.4 oz)   08/08/23 85.3 kg (188 lb)   07/31/23 81.6 kg (180 lb)   07/07/23 80.3 kg (177 lb)   06/17/23 79.2 kg (174 lb 9.7 oz)   06/03/23 77.1 kg (170 lb)       Constitutional:  Patient is pleasant, alert, cooperative, and in NAD.  HEENT:  NCAT. PERRLA. EOM's intact.   Neck:  CVP appears normal. No carotid bruits.   Pulmonary: Normal respiratory effort. CTAB.   Cardiac: RRR, normal S1/S2, no S3/S4, no murmur or rub.   Abdomen:  Non-tender abdomen, no hepatosplenomegaly appreciated.   Vascular: Pulses in the upper and lower extremities are 2+ and equal bilaterally.  Extremities: No edema, erythema, cyanosis or tenderness appreciated.  Skin:  No rashes or lesions appreciated.   Neurological:  " "No gross motor or sensory deficits.   Psych: Appropriate affect.          Data   Labs reviewed:  Recent Labs   Lab Test 10/08/23  2216 06/03/23  0128 12/16/22  1527   LDL  --   --  126*   HDL  --   --  49   NHDL  --   --  146*   CHOL  --   --  195   TRIG  --   --  102   TSH 2.01 4.21* 1.65       Lab Results   Component Value Date    WBC 6.0 12/12/2023    WBC 5.6 01/29/2021    RBC 5.25 12/12/2023    RBC 5.12 01/29/2021    HGB 16.0 12/12/2023    HGB 15.4 01/29/2021    HCT 45.9 12/12/2023    HCT 45.2 01/29/2021    MCV 87 12/12/2023    MCV 88 01/29/2021    MCH 30.5 12/12/2023    MCH 30.1 01/29/2021    MCHC 34.9 12/12/2023    MCHC 34.1 01/29/2021    RDW 11.8 12/12/2023    RDW 11.5 01/29/2021     12/12/2023     01/29/2021       Lab Results   Component Value Date     12/12/2023     01/29/2021    POTASSIUM 3.8 12/12/2023    POTASSIUM 3.8 01/29/2021    CHLORIDE 102 12/12/2023    CHLORIDE 107 01/29/2021    CO2 25 12/12/2023    CO2 28 01/29/2021    ANIONGAP 12 12/12/2023    ANIONGAP 4 01/29/2021    GLC 92 12/12/2023     (H) 10/09/2023    GLC 83 01/29/2021    BUN 25.0 (H) 12/12/2023    BUN 13 01/29/2021    CR 0.94 12/12/2023    CR 0.95 01/29/2021    GFRESTIMATED >90 12/12/2023    GFRESTIMATED >90 01/29/2021    GFRESTBLACK >90 01/29/2021    SHAI 9.7 12/12/2023    SHAI 9.3 01/29/2021      Lab Results   Component Value Date    AST 28 06/03/2023    AST 24 01/29/2021    ALT 33 06/03/2023    ALT 34 01/29/2021       No results found for: \"A1C\"    No results found for: \"INR\"         Problem List     Patient Active Problem List   Diagnosis    Atrial fibrillation with RVR (H)    PAF (paroxysmal atrial fibrillation) (H)            Medications     Current Outpatient Medications   Medication Sig Dispense Refill    glucosamine 500 MG CAPS capsule Take 500 mg by mouth daily      metoprolol succinate ER (TOPROL XL) 25 MG 24 hr tablet Take 1 tablet (25 mg) by mouth daily 90 tablet 1    acetaminophen (TYLENOL) 325 " MG tablet Take 2 tablets (650 mg) by mouth every 6 hours as needed for mild pain or other (and adjunct with moderate or severe pain or per patient request) (Patient not taking: Reported on 3/6/2024)      cyclobenzaprine (FLEXERIL) 5 MG tablet Take 1-2 tablets (5-10 mg) by mouth 3 times daily as needed for muscle spasms (Patient not taking: Reported on 3/6/2024) 20 tablet 0            Past Medical History     Past Medical History:   Diagnosis Date    Paroxysmal atrial fibrillation      Past Surgical History:   Procedure Laterality Date    APPENDECTOMY  2008?    EP ABLATION FOCAL AFIB N/A 7/31/2023    Procedure: Ablation Atrial Fibrilation;  Surgeon: William Domingo MD;  Location:  [facility]     Family History   Problem Relation Age of Onset    Hypertension Mother     Hypertension Father     Factor V Leiden deficiency Brother     Factor V Leiden deficiency Paternal Half-Sister      Social History     Socioeconomic History    Marital status:      Spouse name: Not on file    Number of children: Not on file    Years of education: Not on file    Highest education level: Not on file   Occupational History    Not on file   Tobacco Use    Smoking status: Never    Smokeless tobacco: Never   Vaping Use    Vaping Use: Never used   Substance and Sexual Activity    Alcohol use: Yes     Comment: 0-3-4 per day    Drug use: Never    Sexual activity: Yes     Partners: Female     Birth control/protection: Condom   Other Topics Concern    Parent/sibling w/ CABG, MI or angioplasty before 65F 55M? No   Social History Narrative    Not on file     Social Determinants of Health     Financial Resource Strain: Low Risk  (10/3/2023)    Financial Resource Strain     Within the past 12 months, have you or your family members you live with been unable to get utilities (heat, electricity) when it was really needed?: No   Food Insecurity: Low Risk  (10/3/2023)    Food Insecurity     Within the past 12 months, did you worry  that your food would run out before you got money to buy more?: No     Within the past 12 months, did the food you bought just not last and you didn t have money to get more?: No   Transportation Needs: Low Risk  (10/3/2023)    Transportation Needs     Within the past 12 months, has lack of transportation kept you from medical appointments, getting your medicines, non-medical meetings or appointments, work, or from getting things that you need?: No   Physical Activity: Not on file   Stress: Not on file   Social Connections: Not on file   Interpersonal Safety: Not on file   Housing Stability: Low Risk  (10/3/2023)    Housing Stability     Do you have housing? : Yes     Are you worried about losing your housing?: No            Allergies   Bee venom and Blood transfusion related (informational only)    Today's clinic visit entailed:  The following tests were independently interpreted by me as noted in my documentation: ecg  30 minutes spent by me on the date of the encounter doing chart review, history and exam, documentation and further activities per the note  Provider  Link to [facility] Help Grid     The level of medical decision making during this visit was of moderate complexity.       Thank you for allowing me to participate in the care of your patient.      Sincerely,     William Medeiros MD     [facility]  cc:   Meli Lerma PA-C  [address] [place] [id number]

## 2024-03-06 NOTE — PROGRESS NOTES
"  Electrophysiology Clinic Progress Note    Vladimir Gallego MRN# [id number]   YOB: 1985 Age: 38 year old     Primary cardiologist:          Assessment and Plan   Delightful pt with symptomatic pAF who underwent PVI and empiric ablation of CTI last July as a first line therapy. Pt had AF few months later but converted spontaneously. No AF since. Was in ED a few months ago for cp which was thought from GERD. Reassured pt that AF can recur in 3 months post AF blanking period. The longer out from ablation AF free the better the prognosis for long term outcome. May consider repeat ablation if AF recurs. See me in a year.             Review of Systems     12-pt ROS is negative except for as noted in the HPI.          Physical Exam     Vitals: /77   Pulse 82   Ht 1.702 m (5' 7\")   Wt 80.5 kg (177 lb 8 oz)   SpO2 96%   BMI 27.80 kg/m    Wt Readings from Last 10 Encounters:   03/06/24 80.5 kg (177 lb 8 oz)   12/12/23 81.6 kg (180 lb)   11/01/23 82.6 kg (182 lb)   10/09/23 83.4 kg (183 lb 14.4 oz)   08/08/23 82.5 kg (181 lb 14.4 oz)   08/08/23 85.3 kg (188 lb)   07/31/23 81.6 kg (180 lb)   07/07/23 80.3 kg (177 lb)   06/17/23 79.2 kg (174 lb 9.7 oz)   06/03/23 77.1 kg (170 lb)       Constitutional:  Patient is pleasant, alert, cooperative, and in NAD.  HEENT:  NCAT. PERRLA. EOM's intact.   Neck:  CVP appears normal. No carotid bruits.   Pulmonary: Normal respiratory effort. CTAB.   Cardiac: RRR, normal S1/S2, no S3/S4, no murmur or rub.   Abdomen:  Non-tender abdomen, no hepatosplenomegaly appreciated.   Vascular: Pulses in the upper and lower extremities are 2+ and equal bilaterally.  Extremities: No edema, erythema, cyanosis or tenderness appreciated.  Skin:  No rashes or lesions appreciated.   Neurological:  No gross motor or sensory deficits.   Psych: Appropriate affect.          Data   Labs reviewed:  Recent Labs   Lab Test 10/08/23  2216 06/03/23  0128 12/16/22  1527   LDL  --   --  126*   HDL  " "--   --  49   NHDL  --   --  146*   CHOL  --   --  195   TRIG  --   --  102   TSH 2.01 4.21* 1.65       Lab Results   Component Value Date    WBC 6.0 12/12/2023    WBC 5.6 01/29/2021    RBC 5.25 12/12/2023    RBC 5.12 01/29/2021    HGB 16.0 12/12/2023    HGB 15.4 01/29/2021    HCT 45.9 12/12/2023    HCT 45.2 01/29/2021    MCV 87 12/12/2023    MCV 88 01/29/2021    MCH 30.5 12/12/2023    MCH 30.1 01/29/2021    MCHC 34.9 12/12/2023    MCHC 34.1 01/29/2021    RDW 11.8 12/12/2023    RDW 11.5 01/29/2021     12/12/2023     01/29/2021       Lab Results   Component Value Date     12/12/2023     01/29/2021    POTASSIUM 3.8 12/12/2023    POTASSIUM 3.8 01/29/2021    CHLORIDE 102 12/12/2023    CHLORIDE 107 01/29/2021    CO2 25 12/12/2023    CO2 28 01/29/2021    ANIONGAP 12 12/12/2023    ANIONGAP 4 01/29/2021    GLC 92 12/12/2023     (H) 10/09/2023    GLC 83 01/29/2021    BUN 25.0 (H) 12/12/2023    BUN 13 01/29/2021    CR 0.94 12/12/2023    CR 0.95 01/29/2021    GFRESTIMATED >90 12/12/2023    GFRESTIMATED >90 01/29/2021    GFRESTBLACK >90 01/29/2021    SHAI 9.7 12/12/2023    SHAI 9.3 01/29/2021      Lab Results   Component Value Date    AST 28 06/03/2023    AST 24 01/29/2021    ALT 33 06/03/2023    ALT 34 01/29/2021       No results found for: \"A1C\"    No results found for: \"INR\"         Problem List     Patient Active Problem List   Diagnosis    Atrial fibrillation with RVR (H)    PAF (paroxysmal atrial fibrillation) (H)            Medications     Current Outpatient Medications   Medication Sig Dispense Refill    glucosamine 500 MG CAPS capsule Take 500 mg by mouth daily      metoprolol succinate ER (TOPROL XL) 25 MG 24 hr tablet Take 1 tablet (25 mg) by mouth daily 90 tablet 1    acetaminophen (TYLENOL) 325 MG tablet Take 2 tablets (650 mg) by mouth every 6 hours as needed for mild pain or other (and adjunct with moderate or severe pain or per patient request) (Patient not taking: Reported on " 3/6/2024)      cyclobenzaprine (FLEXERIL) 5 MG tablet Take 1-2 tablets (5-10 mg) by mouth 3 times daily as needed for muscle spasms (Patient not taking: Reported on 3/6/2024) 20 tablet 0            Past Medical History     Past Medical History:   Diagnosis Date    Paroxysmal atrial fibrillation      Past Surgical History:   Procedure Laterality Date    APPENDECTOMY  2008?    EP ABLATION FOCAL AFIB N/A 7/31/2023    Procedure: Ablation Atrial Fibrilation;  Surgeon: William Domingo MD;  Location:  [facility]     Family History   Problem Relation Age of Onset    Hypertension Mother     Hypertension Father     Factor V Leiden deficiency Brother     Factor V Leiden deficiency Paternal Half-Sister      Social History     Socioeconomic History    Marital status:      Spouse name: Not on file    Number of children: Not on file    Years of education: Not on file    Highest education level: Not on file   Occupational History    Not on file   Tobacco Use    Smoking status: Never    Smokeless tobacco: Never   Vaping Use    Vaping Use: Never used   Substance and Sexual Activity    Alcohol use: Yes     Comment: 0-3-4 per day    Drug use: Never    Sexual activity: Yes     Partners: Female     Birth control/protection: Condom   Other Topics Concern    Parent/sibling w/ CABG, MI or angioplasty before 65F 55M? No   Social History Narrative    Not on file     Social Determinants of Health     Financial Resource Strain: Low Risk  (10/3/2023)    Financial Resource Strain     Within the past 12 months, have you or your family members you live with been unable to get utilities (heat, electricity) when it was really needed?: No   Food Insecurity: Low Risk  (10/3/2023)    Food Insecurity     Within the past 12 months, did you worry that your food would run out before you got money to buy more?: No     Within the past 12 months, did the food you bought just not last and you didn t have money to get more?: No    Transportation Needs: Low Risk  (10/3/2023)    Transportation Needs     Within the past 12 months, has lack of transportation kept you from medical appointments, getting your medicines, non-medical meetings or appointments, work, or from getting things that you need?: No   Physical Activity: Not on file   Stress: Not on file   Social Connections: Not on file   Interpersonal Safety: Not on file   Housing Stability: Low Risk  (10/3/2023)    Housing Stability     Do you have housing? : Yes     Are you worried about losing your housing?: No            Allergies   Bee venom and Blood transfusion related (informational only)    Today's clinic visit entailed:  The following tests were independently interpreted by me as noted in my documentation: ecg  30 minutes spent by me on the date of the encounter doing chart review, history and exam, documentation and further activities per the note  Provider  Link to MDM Help Grid     The level of medical decision making during this visit was of moderate complexity.

## 2024-03-15 ENCOUNTER — MYC MEDICAL ADVICE (OUTPATIENT)
Dept: FAMILY MEDICINE | Facility: CLINIC | Age: 39
End: 2024-03-15
Payer: COMMERCIAL

## 2024-03-18 NOTE — TELEPHONE ENCOUNTER
Needs appt for forms please virtual is fine.     He sent and Evisit and letter placed in chart any more formal paperwork needs us to discuss.       Healthy regards,            Claudia Manuel, FNP-BC

## 2024-03-18 NOTE — TELEPHONE ENCOUNTER
LOV 10/3/2023     Please see my chart message below     Please review and advise     Thank you     Farhana Mcdonald RN, BSN  [place] [facility]

## 2024-03-19 ENCOUNTER — VIRTUAL VISIT (OUTPATIENT)
Dept: FAMILY MEDICINE | Facility: CLINIC | Age: 39
End: 2024-03-19
Payer: COMMERCIAL

## 2024-03-19 DIAGNOSIS — Z63.0 MARITAL CONFLICT: ICD-10-CM

## 2024-03-19 DIAGNOSIS — F43.23 SITUATIONAL MIXED ANXIETY AND DEPRESSIVE DISORDER: Primary | ICD-10-CM

## 2024-03-19 PROCEDURE — 99213 OFFICE O/P EST LOW 20 MIN: CPT | Mod: 95 | Performed by: NURSE PRACTITIONER

## 2024-03-19 SDOH — SOCIAL STABILITY - SOCIAL INSECURITY: PROBLEMS IN RELATIONSHIP WITH SPOUSE OR PARTNER: Z63.0

## 2024-03-19 NOTE — TELEPHONE ENCOUNTER
Patient called to check status of paperwork, informed of provider message below. Scheduled for virtual visit today 3/19/2024.

## 2024-03-19 NOTE — PROGRESS NOTES
"Vladimir is a 38 year old who is being evaluated via a billable video visit.    How would you like to obtain your AVS? MyChart  If the video visit is dropped, the invitation should be resent by: Text to cell phone: [phone]  Will anyone else be joining your video visit? No      Assessment & Plan     Situational mixed anxiety and depressive disorder  Marital conflict  Paperwork completed for apartment to allow emotional support animal.   Follow up for wellness exam.   Vladimir verbalizes understanding of plan of care and is in agreement.   - REVIEW OF HEALTH MAINTENANCE PROTOCOL ORDERS          BMI  Estimated body mass index is 27.8 kg/m  as calculated from the following:    Height as of 3/6/24: 1.702 m (5' 7\").    Weight as of 3/6/24: 80.5 kg (177 lb 8 oz).     Return in about 4 weeks (around 4/16/2024) for Wellness exam fasting labs.    Subjective   Vladimir is a 38 year old, presenting for the following health issues:  Forms        3/19/2024     5:16 PM   Additional Questions   Roomed by Africa CMA   Accompanied by Self   Patient is wanting to fill out forms.   History of Present Illness       Mental Health Follow-up:  Patient presents to follow-up on Depression.Patient's depression since last visit has been:  Good  The patient is not having other symptoms associated with depression.      Any significant life events: relationship concerns, housing concerns, grief or loss and health concerns  Patient is not feeling anxious or having panic attacks.  Patient has no concerns about alcohol or drug use.    He eats 4 or more servings of fruits and vegetables daily.He consumes 0 sweetened beverage(s) daily.He exercises with enough effort to increase his heart rate 60 or more minutes per day.  He exercises with enough effort to increase his heart rate 6 days per week.   He is taking medications regularly.     Needs documentation dog is emotional support animal for new apartment to avoid monthly extra cost in rent.   Martial " separation increased anxiety and depression.       Constitutional, HEENT, cardiovascular, pulmonary, GI, , musculoskeletal, neuro, skin, endocrine and psych systems are negative, except as otherwise noted in the HPI.        Objective           Vitals:  No vitals were obtained today due to virtual visit.    Physical Exam   GENERAL: alert and no distress  EYES: Eyes grossly normal to inspection.  No discharge or erythema, or obvious scleral/conjunctival abnormalities.  RESP: No audible wheeze, cough, or visible cyanosis.    SKIN: Visible skin clear. No significant rash, abnormal pigmentation or lesions.  NEURO: Cranial nerves grossly intact.  Mentation and speech appropriate for age.  PSYCH: Appropriate affect, tone, and pace of words      Video-Visit Details    Type of service:  Video Visit   Originating Location (pt. Location): Home   Distant Location (provider location):  On-site  Platform used for Video Visit: Didier         Signed Electronically by: DEION Palomo CNP

## 2024-03-20 ENCOUNTER — MYC MEDICAL ADVICE (OUTPATIENT)
Dept: FAMILY MEDICINE | Facility: CLINIC | Age: 39
End: 2024-03-20
Payer: COMMERCIAL

## 2024-03-20 ENCOUNTER — TELEPHONE (OUTPATIENT)
Dept: FAMILY MEDICINE | Facility: CLINIC | Age: 39
End: 2024-03-20
Payer: COMMERCIAL

## 2024-03-20 NOTE — TELEPHONE ENCOUNTER
FYI - Status Update    Who is Calling: patient    Update: Fax # for TD form   [phone]    Does caller want a call/response back: Yes     Could we send this information to you in [employer] or would you prefer to receive a phone call?:   Patient would prefer a phone call   Okay to leave a detailed message?: Yes at Cell number on file:    Telephone Information:   Mobile [phone]

## 2024-03-20 NOTE — TELEPHONE ENCOUNTER
Fax # for TD form [phone]     Message left  today - not sure what this is for based on encounters?      Africa K  
Please see my chart message below     Please review and advise     Thank you     Farhana Mcdonald RN, BSN  [place] [facility]     
Spoke with pt to let him know --    Letter have been faxed to number below and mailed copy to pt.    Sent to scan  
Abdomen soft, tender, nondistended, bowel sounds present in all 4 quadrants.

## 2024-03-20 NOTE — TELEPHONE ENCOUNTER
Spoke with pt to inform him --Letter is completed and faxed to [employer] at [phone]  Mailed copy to pt.  Sent to holger SPENCER CMA     0 = independent

## 2025-03-06 ENCOUNTER — OFFICE VISIT (OUTPATIENT)
Dept: CARDIOLOGY | Facility: CLINIC | Age: 40
End: 2025-03-06
Payer: COMMERCIAL

## 2025-03-06 VITALS
SYSTOLIC BLOOD PRESSURE: 126 MMHG | HEIGHT: 67 IN | DIASTOLIC BLOOD PRESSURE: 76 MMHG | HEART RATE: 74 BPM | WEIGHT: 180 LBS | BODY MASS INDEX: 28.25 KG/M2

## 2025-03-06 DIAGNOSIS — I48.0 PAF (PAROXYSMAL ATRIAL FIBRILLATION) (H): ICD-10-CM

## 2025-03-06 NOTE — LETTER
"3/6/2025    Claudia Manuel, APRN CNP  [address] [facility] [address] [address]    RE: Vladimir Gallego       Dear Colleague,     I had the pleasure of seeing Vladimir Gallego in the [facility].    Electrophysiology Clinic Progress Note    Vladimir Gallego MRN# [id number]   YOB: 1985 Age: 39 year old     Primary cardiologist:          Assessment and Plan   Delightful 39 pt with symptomatic pAF, vagally mediated who underwent PVI and empiric ablation of CTI in 7/23 as a first line therapy. Pt had AF few months later but converted spontaneously. No AF since. Pt is getting ready to run another marathon. Optimistic that he'll have long term remission but unfortunately, AF can recur even 10 yrs out due to potential new triggers. Wish pt luck. See me prn.          The longitudinal plan of care of the diagnosis(es)/condition (s) as documented were addressed during this visit. Due to the added complexity in care, I will continue to support the patient in the subsequent management and with ongoing continuity of care.       Review of Systems     12-pt ROS is negative except for as noted in the HPI.          Physical Exam     Vitals: /76   Pulse 74   Ht 1.702 m (5' 7\")   Wt 81.6 kg (180 lb)   BMI 28.19 kg/m    Wt Readings from Last 10 Encounters:   03/06/25 81.6 kg (180 lb)   03/06/24 80.5 kg (177 lb 8 oz)   12/12/23 81.6 kg (180 lb)   11/01/23 82.6 kg (182 lb)   10/09/23 83.4 kg (183 lb 14.4 oz)   08/08/23 82.5 kg (181 lb 14.4 oz)   08/08/23 85.3 kg (188 lb)   07/31/23 81.6 kg (180 lb)   07/07/23 80.3 kg (177 lb)   06/17/23 79.2 kg (174 lb 9.7 oz)       Constitutional:  Patient is pleasant, alert, cooperative, and in NAD.  HEENT:  NCAT. PERRLA. EOM's intact.   Neck:  CVP appears normal. No carotid bruits.   Pulmonary: Normal respiratory effort. CTAB.   Cardiac: RRR, normal S1/S2, no S3/S4, no murmur or rub.   Abdomen:  Non-tender abdomen, no hepatosplenomegaly appreciated. " "  Vascular: Pulses in the upper and lower extremities are 2+ and equal bilaterally.  Extremities: No edema, erythema, cyanosis or tenderness appreciated.  Skin:  No rashes or lesions appreciated.   Neurological:  No gross motor or sensory deficits.   Psych: Appropriate affect.          Data   Labs reviewed:  Recent Labs   Lab Test 10/08/23  2216 06/03/23  0128 12/16/22  1527   LDL  --   --  126*   HDL  --   --  49   NHDL  --   --  146*   CHOL  --   --  195   TRIG  --   --  102   TSH 2.01 4.21* 1.65       Lab Results   Component Value Date    WBC 6.0 12/12/2023    WBC 5.6 01/29/2021    RBC 5.25 12/12/2023    RBC 5.12 01/29/2021    HGB 16.0 12/12/2023    HGB 15.4 01/29/2021    HCT 45.9 12/12/2023    HCT 45.2 01/29/2021    MCV 87 12/12/2023    MCV 88 01/29/2021    MCH 30.5 12/12/2023    MCH 30.1 01/29/2021    MCHC 34.9 12/12/2023    MCHC 34.1 01/29/2021    RDW 11.8 12/12/2023    RDW 11.5 01/29/2021     12/12/2023     01/29/2021       Lab Results   Component Value Date     12/12/2023     01/29/2021    POTASSIUM 3.8 12/12/2023    POTASSIUM 3.8 01/29/2021    CHLORIDE 102 12/12/2023    CHLORIDE 107 01/29/2021    CO2 25 12/12/2023    CO2 28 01/29/2021    ANIONGAP 12 12/12/2023    ANIONGAP 4 01/29/2021    GLC 92 12/12/2023     (H) 10/09/2023    GLC 83 01/29/2021    BUN 25.0 (H) 12/12/2023    BUN 13 01/29/2021    CR 0.94 12/12/2023    CR 0.95 01/29/2021    GFRESTIMATED >90 12/12/2023    GFRESTIMATED >90 01/29/2021    GFRESTBLACK >90 01/29/2021    SHAI 9.7 12/12/2023    SHAI 9.3 01/29/2021      Lab Results   Component Value Date    AST 28 06/03/2023    AST 24 01/29/2021    ALT 33 06/03/2023    ALT 34 01/29/2021       No results found for: \"A1C\"    No results found for: \"INR\"         Problem List     Patient Active Problem List   Diagnosis     Atrial fibrillation with RVR (H)     PAF (paroxysmal atrial fibrillation) (H)            Medications     Current Outpatient Medications   Medication Sig " Dispense Refill     glucosamine 500 MG CAPS capsule Take 500 mg by mouth daily.       acetaminophen (TYLENOL) 325 MG tablet Take 2 tablets (650 mg) by mouth every 6 hours as needed for mild pain or other (and adjunct with moderate or severe pain or per patient request) (Patient not taking: Reported on 3/6/2025)       cyclobenzaprine (FLEXERIL) 5 MG tablet Take 1-2 tablets (5-10 mg) by mouth 3 times daily as needed for muscle spasms (Patient not taking: Reported on 3/6/2025) 20 tablet 0     metoprolol succinate ER (TOPROL XL) 25 MG 24 hr tablet Take 1 tablet (25 mg) by mouth daily (Patient not taking: Reported on 3/6/2025) 90 tablet 1            Past Medical History     Past Medical History:   Diagnosis Date     Paroxysmal atrial fibrillation      Past Surgical History:   Procedure Laterality Date     APPENDECTOMY  2008?     EP ABLATION FOCAL AFIB N/A 7/31/2023    Procedure: Ablation Atrial Fibrilation;  Surgeon: William Domingo MD;  Location:  [facility]     Family History   Problem Relation Age of Onset     Hypertension Mother      Hypertension Father      Factor V Leiden deficiency Brother      Factor V Leiden deficiency Paternal Half-Sister      Social History     Socioeconomic History     Marital status:      Spouse name: Not on file     Number of children: Not on file     Years of education: Not on file     Highest education level: Not on file   Occupational History     Not on file   Tobacco Use     Smoking status: Never     Smokeless tobacco: Never   Vaping Use     Vaping status: Never Used   Substance and Sexual Activity     Alcohol use: Not Currently     Drug use: Never     Sexual activity: Yes     Partners: Female     Birth control/protection: Condom   Other Topics Concern     Parent/sibling w/ CABG, MI or angioplasty before 65F 55M? No   Social History Narrative     Not on file     Social Drivers of Health     Financial Resource Strain: Low Risk  (10/3/2023)    Financial Resource  Strain      Within the past 12 months, have you or your family members you live with been unable to get utilities (heat, electricity) when it was really needed?: No   Food Insecurity: Low Risk  (10/3/2023)    Food Insecurity      Within the past 12 months, did you worry that your food would run out before you got money to buy more?: No      Within the past 12 months, did the food you bought just not last and you didn t have money to get more?: No   Transportation Needs: Low Risk  (10/3/2023)    Transportation Needs      Within the past 12 months, has lack of transportation kept you from medical appointments, getting your medicines, non-medical meetings or appointments, work, or from getting things that you need?: No   Physical Activity: Not on file   Stress: Not on file   Social Connections: Unknown (1/1/2022)    Received from [facility], [facility] [employer] [facility]    Social Connections      Frequency of Communication with Friends and Family: Not on file   Interpersonal Safety: Not on file   Housing Stability: Low Risk  (10/3/2023)    Housing Stability      Do you have housing? : Yes      Are you worried about losing your housing?: No            Allergies   Bee venom and Blood transfusion related (informational only)    Today's clinic visit entailed:  The following tests were independently interpreted by me as noted in my documentation: ecg  30 minutes spent by me on the date of the encounter doing chart review, history and exam, documentation and further activities per the note  Provider  Link to [facility] Help Grid     The level of medical decision making during this visit was of moderate complexity.       Thank you for allowing me to participate in the care of your patient.      Sincerely,     William Medeiros MD     [facility]  cc:   Claudia Manuel, APRN CNP  [address] [place]  [address]

## 2025-03-06 NOTE — PROGRESS NOTES
"  Electrophysiology Clinic Progress Note    Vladimir Gallego MRN# [id number]   YOB: 1985 Age: 39 year old     Primary cardiologist:          Assessment and Plan   Delightful 39 pt with symptomatic pAF, vagally mediated who underwent PVI and empiric ablation of CTI in 7/23 as a first line therapy. Pt had AF few months later but converted spontaneously. No AF since. Pt is getting ready to run another marathon. Optimistic that he'll have long term remission but unfortunately, AF can recur even 10 yrs out due to potential new triggers. Wish pt luck. See me prn.          The longitudinal plan of care of the diagnosis(es)/condition (s) as documented were addressed during this visit. Due to the added complexity in care, I will continue to support the patient in the subsequent management and with ongoing continuity of care.       Review of Systems     12-pt ROS is negative except for as noted in the HPI.          Physical Exam     Vitals: /76   Pulse 74   Ht 1.702 m (5' 7\")   Wt 81.6 kg (180 lb)   BMI 28.19 kg/m    Wt Readings from Last 10 Encounters:   03/06/25 81.6 kg (180 lb)   03/06/24 80.5 kg (177 lb 8 oz)   12/12/23 81.6 kg (180 lb)   11/01/23 82.6 kg (182 lb)   10/09/23 83.4 kg (183 lb 14.4 oz)   08/08/23 82.5 kg (181 lb 14.4 oz)   08/08/23 85.3 kg (188 lb)   07/31/23 81.6 kg (180 lb)   07/07/23 80.3 kg (177 lb)   06/17/23 79.2 kg (174 lb 9.7 oz)       Constitutional:  Patient is pleasant, alert, cooperative, and in NAD.  HEENT:  NCAT. PERRLA. EOM's intact.   Neck:  CVP appears normal. No carotid bruits.   Pulmonary: Normal respiratory effort. CTAB.   Cardiac: RRR, normal S1/S2, no S3/S4, no murmur or rub.   Abdomen:  Non-tender abdomen, no hepatosplenomegaly appreciated.   Vascular: Pulses in the upper and lower extremities are 2+ and equal bilaterally.  Extremities: No edema, erythema, cyanosis or tenderness appreciated.  Skin:  No rashes or lesions appreciated.   Neurological:  No gross " "motor or sensory deficits.   Psych: Appropriate affect.          Data   Labs reviewed:  Recent Labs   Lab Test 10/08/23  2216 06/03/23  0128 12/16/22  1527   LDL  --   --  126*   HDL  --   --  49   NHDL  --   --  146*   CHOL  --   --  195   TRIG  --   --  102   TSH 2.01 4.21* 1.65       Lab Results   Component Value Date    WBC 6.0 12/12/2023    WBC 5.6 01/29/2021    RBC 5.25 12/12/2023    RBC 5.12 01/29/2021    HGB 16.0 12/12/2023    HGB 15.4 01/29/2021    HCT 45.9 12/12/2023    HCT 45.2 01/29/2021    MCV 87 12/12/2023    MCV 88 01/29/2021    MCH 30.5 12/12/2023    MCH 30.1 01/29/2021    MCHC 34.9 12/12/2023    MCHC 34.1 01/29/2021    RDW 11.8 12/12/2023    RDW 11.5 01/29/2021     12/12/2023     01/29/2021       Lab Results   Component Value Date     12/12/2023     01/29/2021    POTASSIUM 3.8 12/12/2023    POTASSIUM 3.8 01/29/2021    CHLORIDE 102 12/12/2023    CHLORIDE 107 01/29/2021    CO2 25 12/12/2023    CO2 28 01/29/2021    ANIONGAP 12 12/12/2023    ANIONGAP 4 01/29/2021    GLC 92 12/12/2023     (H) 10/09/2023    GLC 83 01/29/2021    BUN 25.0 (H) 12/12/2023    BUN 13 01/29/2021    CR 0.94 12/12/2023    CR 0.95 01/29/2021    GFRESTIMATED >90 12/12/2023    GFRESTIMATED >90 01/29/2021    GFRESTBLACK >90 01/29/2021    SHAI 9.7 12/12/2023    SHAI 9.3 01/29/2021      Lab Results   Component Value Date    AST 28 06/03/2023    AST 24 01/29/2021    ALT 33 06/03/2023    ALT 34 01/29/2021       No results found for: \"A1C\"    No results found for: \"INR\"         Problem List     Patient Active Problem List   Diagnosis    Atrial fibrillation with RVR (H)    PAF (paroxysmal atrial fibrillation) (H)            Medications     Current Outpatient Medications   Medication Sig Dispense Refill    glucosamine 500 MG CAPS capsule Take 500 mg by mouth daily.      acetaminophen (TYLENOL) 325 MG tablet Take 2 tablets (650 mg) by mouth every 6 hours as needed for mild pain or other (and adjunct with " moderate or severe pain or per patient request) (Patient not taking: Reported on 3/6/2025)      cyclobenzaprine (FLEXERIL) 5 MG tablet Take 1-2 tablets (5-10 mg) by mouth 3 times daily as needed for muscle spasms (Patient not taking: Reported on 3/6/2025) 20 tablet 0    metoprolol succinate ER (TOPROL XL) 25 MG 24 hr tablet Take 1 tablet (25 mg) by mouth daily (Patient not taking: Reported on 3/6/2025) 90 tablet 1            Past Medical History     Past Medical History:   Diagnosis Date    Paroxysmal atrial fibrillation      Past Surgical History:   Procedure Laterality Date    APPENDECTOMY  2008?    EP ABLATION FOCAL AFIB N/A 7/31/2023    Procedure: Ablation Atrial Fibrilation;  Surgeon: William Domingo MD;  Location:  [facility]     Family History   Problem Relation Age of Onset    Hypertension Mother     Hypertension Father     Factor V Leiden deficiency Brother     Factor V Leiden deficiency Paternal Half-Sister      Social History     Socioeconomic History    Marital status:      Spouse name: Not on file    Number of children: Not on file    Years of education: Not on file    Highest education level: Not on file   Occupational History    Not on file   Tobacco Use    Smoking status: Never    Smokeless tobacco: Never   Vaping Use    Vaping status: Never Used   Substance and Sexual Activity    Alcohol use: Not Currently    Drug use: Never    Sexual activity: Yes     Partners: Female     Birth control/protection: Condom   Other Topics Concern    Parent/sibling w/ CABG, MI or angioplasty before 65F 55M? No   Social History Narrative    Not on file     Social Drivers of Health     Financial Resource Strain: Low Risk  (10/3/2023)    Financial Resource Strain     Within the past 12 months, have you or your family members you live with been unable to get utilities (heat, electricity) when it was really needed?: No   Food Insecurity: Low Risk  (10/3/2023)    Food Insecurity     Within the past 12  months, did you worry that your food would run out before you got money to buy more?: No     Within the past 12 months, did the food you bought just not last and you didn t have money to get more?: No   Transportation Needs: Low Risk  (10/3/2023)    Transportation Needs     Within the past 12 months, has lack of transportation kept you from medical appointments, getting your medicines, non-medical meetings or appointments, work, or from getting things that you need?: No   Physical Activity: Not on file   Stress: Not on file   Social Connections: Unknown (1/1/2022)    [facility]    Social Connections     Frequency of Communication with Friends and Family: Not on file   Interpersonal Safety: Not on file   Housing Stability: Low Risk  (10/3/2023)    Housing Stability     Do you have housing? : Yes     Are you worried about losing your housing?: No            Allergies   Bee venom and Blood transfusion related (informational only)    Today's clinic visit entailed:  The following tests were independently interpreted by me as noted in my documentation: ecg  30 minutes spent by me on the date of the encounter doing chart review, history and exam, documentation and further activities per the note  Provider  Link to [facility] Help Grid     The level of medical decision making during this visit was of moderate complexity.

## 2025-03-15 ENCOUNTER — HEALTH MAINTENANCE LETTER (OUTPATIENT)
Age: 40
End: 2025-03-15

## 2025-04-04 NOTE — PROVIDER NOTIFICATION
Message:    [id number] Good am doc, just relaying, patient complaining of 2/10 chest pressure, more to the left sternal border than central, only associated symptom is feeling diaphoretic. Denies pain elsewhere. Currently maxed on Dilt at 15mg/hr; BP in the 130's but HR fluctuant to 110's to 130's. Since not present on admission, EKG done and transmitted to system for review. Kindly advise. Thank you! Igor TAN [phone]      Provider notified:  Leonard BAE      Via:   [employer] Messaging      At: [id number]            Resolution:                   MD called back for:     - No changes in EKG  - Troponin check  - Try Oxycodone  
pt c/o right eyelid swelling and pain x3 days

## (undated) DEVICE — CATH NAV PENTARAY F CURVE

## (undated) DEVICE — INTRODUCER SHEATH VASC CATH 8.5FR CARTO GIDE STH MED D138502

## (undated) DEVICE — CATH EP 7FR X 115CM DECANAV CA

## (undated) DEVICE — CATH SOUNDSTAR 8FRX90CM 10439011

## (undated) DEVICE — DEFIB PRO-PADZ LVP LQD GEL ADULT 8900-2105-01

## (undated) DEVICE — CATH EP CATH EP REPRO DAIG RSPN FX CRV DX EP C

## (undated) DEVICE — PACK EP SRG PROC LF DISP SAN32EPFSR

## (undated) DEVICE — CATH RF CARD ABL BID JJ THERMO

## (undated) DEVICE — PATCH CARTO 3 EXTERNAL REFERENCE 3D MAPPING CREFP6

## (undated) DEVICE — TUBE SET SMARKABLATE IRRIGATION

## (undated) DEVICE — NEEDLE TRANSSEPTAL 18GA X 98CM 86 DEG

## (undated) DEVICE — INTRODUCER SHEATH FAST-CATH 9FRX12CM 406116

## (undated) RX ORDER — FENTANYL CITRATE 50 UG/ML
INJECTION, SOLUTION INTRAMUSCULAR; INTRAVENOUS
Status: DISPENSED
Start: 2023-07-31